# Patient Record
Sex: MALE | Race: BLACK OR AFRICAN AMERICAN | Employment: STUDENT | URBAN - METROPOLITAN AREA
[De-identification: names, ages, dates, MRNs, and addresses within clinical notes are randomized per-mention and may not be internally consistent; named-entity substitution may affect disease eponyms.]

---

## 2023-08-05 ENCOUNTER — HOSPITAL ENCOUNTER (OUTPATIENT)
Age: 23
Discharge: HOME | End: 2023-08-05
Attending: EMERGENCY MEDICINE
Payer: COMMERCIAL

## 2023-08-05 VITALS
SYSTOLIC BLOOD PRESSURE: 150 MMHG | RESPIRATION RATE: 18 BRPM | DIASTOLIC BLOOD PRESSURE: 90 MMHG | TEMPERATURE: 98 F | HEART RATE: 100 BPM | OXYGEN SATURATION: 100 %

## 2023-08-05 DIAGNOSIS — K29.00 ACUTE GASTRITIS, PRESENCE OF BLEEDING UNSPECIFIED, UNSPECIFIED GASTRITIS TYPE: Primary | ICD-10-CM

## 2023-08-05 PROCEDURE — 99203 OFFICE O/P NEW LOW 30 MIN: CPT

## 2023-08-05 PROCEDURE — 99202 OFFICE O/P NEW SF 15 MIN: CPT

## 2023-08-05 RX ORDER — PANTOPRAZOLE SODIUM 20 MG/1
20 TABLET, DELAYED RELEASE ORAL DAILY
Qty: 30 TABLET | Refills: 0 | Status: SHIPPED | OUTPATIENT
Start: 2023-08-05 | End: 2023-09-04

## 2023-08-05 RX ORDER — DEXTROAMPHETAMINE SACCHARATE, AMPHETAMINE ASPARTATE, DEXTROAMPHETAMINE SULFATE AND AMPHETAMINE SULFATE 2.5; 2.5; 2.5; 2.5 MG/1; MG/1; MG/1; MG/1
TABLET ORAL 2 TIMES DAILY
COMMUNITY
Start: 2023-07-25

## 2023-08-05 NOTE — ED INITIAL ASSESSMENT (HPI)
Presents with intermittent epigastric pain for 2-3 weeks. No fever. No n/v/d. States pain wakes hip up from sleep at times.

## 2023-08-05 NOTE — DISCHARGE INSTRUCTIONS
Thank you for visiting our immediate care for your health care needs. Please follow up with your regular doctor in the next 1-2 days. If you have any additional problems please return to the immediate care. Take Protonix as prescribed or take omeprazole over-the-counter.

## 2024-02-12 ENCOUNTER — APPOINTMENT (OUTPATIENT)
Dept: CT IMAGING | Facility: HOSPITAL | Age: 24
End: 2024-02-12
Attending: EMERGENCY MEDICINE
Payer: COMMERCIAL

## 2024-02-12 ENCOUNTER — LAB ENCOUNTER (OUTPATIENT)
Dept: LAB | Facility: HOSPITAL | Age: 24
End: 2024-02-12
Attending: INTERNAL MEDICINE
Payer: COMMERCIAL

## 2024-02-12 ENCOUNTER — OFFICE VISIT (OUTPATIENT)
Dept: INTERNAL MEDICINE CLINIC | Facility: CLINIC | Age: 24
End: 2024-02-12
Payer: COMMERCIAL

## 2024-02-12 ENCOUNTER — HOSPITAL ENCOUNTER (INPATIENT)
Facility: HOSPITAL | Age: 24
LOS: 1 days | Discharge: HOME | End: 2024-02-14
Attending: EMERGENCY MEDICINE | Admitting: HOSPITALIST
Payer: COMMERCIAL

## 2024-02-12 VITALS
WEIGHT: 148 LBS | SYSTOLIC BLOOD PRESSURE: 110 MMHG | DIASTOLIC BLOOD PRESSURE: 60 MMHG | OXYGEN SATURATION: 99 % | HEART RATE: 117 BPM | TEMPERATURE: 98 F | HEIGHT: 66.5 IN | BODY MASS INDEX: 23.5 KG/M2

## 2024-02-12 DIAGNOSIS — D64.9 ANEMIA, UNSPECIFIED TYPE: Primary | ICD-10-CM

## 2024-02-12 DIAGNOSIS — I31.39 PERICARDIAL EFFUSION (HCC): ICD-10-CM

## 2024-02-12 DIAGNOSIS — R10.13 MIDEPIGASTRIC PAIN: Primary | ICD-10-CM

## 2024-02-12 DIAGNOSIS — R07.9 CHEST PAIN, UNSPECIFIED TYPE: ICD-10-CM

## 2024-02-12 DIAGNOSIS — D50.9 MICROCYTIC ANEMIA: ICD-10-CM

## 2024-02-12 DIAGNOSIS — Z00.00 PREVENTATIVE HEALTH CARE: ICD-10-CM

## 2024-02-12 DIAGNOSIS — Z11.3 SCREEN FOR STD (SEXUALLY TRANSMITTED DISEASE): ICD-10-CM

## 2024-02-12 DIAGNOSIS — K92.1 MELENA: ICD-10-CM

## 2024-02-12 DIAGNOSIS — K29.80 DUODENITIS: ICD-10-CM

## 2024-02-12 DIAGNOSIS — R10.13 MIDEPIGASTRIC PAIN: ICD-10-CM

## 2024-02-12 LAB
ALBUMIN SERPL-MCNC: 4.6 G/DL (ref 3.2–4.8)
ALBUMIN/GLOB SERPL: 1.3 {RATIO} (ref 1–2)
ALP LIVER SERPL-CCNC: 106 U/L
ALT SERPL-CCNC: 9 U/L
ANION GAP SERPL CALC-SCNC: 8 MMOL/L (ref 0–18)
ANION GAP SERPL CALC-SCNC: 8 MMOL/L (ref 0–18)
ANTIBODY SCREEN: NEGATIVE
AST SERPL-CCNC: 11 U/L (ref ?–34)
BASOPHILS # BLD AUTO: 0.09 X10(3) UL (ref 0–0.2)
BASOPHILS NFR BLD AUTO: 0.9 %
BILIRUB SERPL-MCNC: 0.3 MG/DL (ref 0.3–1.2)
BUN BLD-MCNC: 9 MG/DL (ref 9–23)
BUN BLD-MCNC: 9 MG/DL (ref 9–23)
BUN/CREAT SERPL: 9.6 (ref 10–20)
BUN/CREAT SERPL: 9.7 (ref 10–20)
CALCIUM BLD-MCNC: 9.4 MG/DL (ref 8.7–10.4)
CALCIUM BLD-MCNC: 9.5 MG/DL (ref 8.7–10.4)
CHLORIDE SERPL-SCNC: 106 MMOL/L (ref 98–112)
CHLORIDE SERPL-SCNC: 107 MMOL/L (ref 98–112)
CHOLEST SERPL-MCNC: 96 MG/DL (ref ?–200)
CO2 SERPL-SCNC: 24 MMOL/L (ref 21–32)
CO2 SERPL-SCNC: 25 MMOL/L (ref 21–32)
CREAT BLD-MCNC: 0.93 MG/DL
CREAT BLD-MCNC: 0.94 MG/DL
DEPRECATED HBV CORE AB SER IA-ACNC: 0.9 NG/ML
DEPRECATED RDW RBC AUTO: 42.5 FL (ref 35.1–46.3)
EGFRCR SERPLBLD CKD-EPI 2021: 118 ML/MIN/1.73M2 (ref 60–?)
EGFRCR SERPLBLD CKD-EPI 2021: 119 ML/MIN/1.73M2 (ref 60–?)
EOSINOPHIL # BLD AUTO: 0.35 X10(3) UL (ref 0–0.7)
EOSINOPHIL NFR BLD AUTO: 3.4 %
ERYTHROCYTE [DISTWIDTH] IN BLOOD BY AUTOMATED COUNT: 20.3 % (ref 11–15)
EST. AVERAGE GLUCOSE BLD GHB EST-MCNC: 100 MG/DL (ref 68–126)
FASTING PATIENT LIPID ANSWER: NO
FASTING STATUS PATIENT QL REPORTED: NO
GLOBULIN PLAS-MCNC: 3.5 G/DL (ref 2.8–4.4)
GLUCOSE BLD-MCNC: 115 MG/DL (ref 70–99)
GLUCOSE BLD-MCNC: 84 MG/DL (ref 70–99)
HBA1C MFR BLD: 5.1 % (ref ?–5.7)
HCT VFR BLD AUTO: 25.7 %
HDLC SERPL-MCNC: 35 MG/DL (ref 40–59)
HGB BLD-MCNC: 6.2 G/DL
HGB BLD-MCNC: 7.1 G/DL
IMM GRANULOCYTES # BLD AUTO: 0.02 X10(3) UL (ref 0–1)
IMM GRANULOCYTES NFR BLD: 0.2 %
IRON SATN MFR SERPL: 2 %
IRON SATN MFR SERPL: 3 %
IRON SERPL-MCNC: 12 UG/DL
IRON SERPL-MCNC: 13 UG/DL
LDLC SERPL CALC-MCNC: 48 MG/DL (ref ?–100)
LIPASE SERPL-CCNC: 56 U/L (ref 13–75)
LYMPHOCYTES # BLD AUTO: 2.56 X10(3) UL (ref 1–4)
LYMPHOCYTES NFR BLD AUTO: 24.8 %
MCH RBC QN AUTO: 14.5 PG (ref 26–34)
MCHC RBC AUTO-ENTMCNC: 24.1 G/DL (ref 31–37)
MCV RBC AUTO: 59.9 FL
MONOCYTES # BLD AUTO: 0.73 X10(3) UL (ref 0.1–1)
MONOCYTES NFR BLD AUTO: 7.1 %
NEUTROPHILS # BLD AUTO: 6.57 X10 (3) UL (ref 1.5–7.7)
NEUTROPHILS # BLD AUTO: 6.57 X10(3) UL (ref 1.5–7.7)
NEUTROPHILS NFR BLD AUTO: 63.6 %
NONHDLC SERPL-MCNC: 61 MG/DL (ref ?–130)
OSMOLALITY SERPL CALC.SUM OF ELEC: 286 MOSM/KG (ref 275–295)
OSMOLALITY SERPL CALC.SUM OF ELEC: 288 MOSM/KG (ref 275–295)
PLATELET # BLD AUTO: 570 10(3)UL (ref 150–450)
PLATELET MORPHOLOGY: NORMAL
POTASSIUM SERPL-SCNC: 3.9 MMOL/L (ref 3.5–5.1)
POTASSIUM SERPL-SCNC: 4.1 MMOL/L (ref 3.5–5.1)
PROT SERPL-MCNC: 8.1 G/DL (ref 5.7–8.2)
RBC # BLD AUTO: 4.29 X10(6)UL
RH BLOOD TYPE: POSITIVE
RH BLOOD TYPE: POSITIVE
SODIUM SERPL-SCNC: 138 MMOL/L (ref 136–145)
SODIUM SERPL-SCNC: 140 MMOL/L (ref 136–145)
T PALLIDUM AB SER QL IA: NONREACTIVE
TIBC SERPL-MCNC: 501 UG/DL (ref 250–425)
TIBC SERPL-MCNC: 507 UG/DL (ref 250–425)
TRANSFERRIN SERPL-MCNC: 336 MG/DL (ref 215–365)
TRANSFERRIN SERPL-MCNC: 340 MG/DL (ref 215–365)
TRIGL SERPL-MCNC: 54 MG/DL (ref 30–149)
TSI SER-ACNC: 1.32 MIU/ML (ref 0.55–4.78)
VLDLC SERPL CALC-MCNC: 8 MG/DL (ref 0–30)
WBC # BLD AUTO: 10.3 X10(3) UL (ref 4–11)

## 2024-02-12 PROCEDURE — 83013 H PYLORI (C-13) BREATH: CPT

## 2024-02-12 PROCEDURE — 36415 COLL VENOUS BLD VENIPUNCTURE: CPT

## 2024-02-12 PROCEDURE — 83540 ASSAY OF IRON: CPT

## 2024-02-12 PROCEDURE — 74177 CT ABD & PELVIS W/CONTRAST: CPT | Performed by: EMERGENCY MEDICINE

## 2024-02-12 PROCEDURE — 30233N1 TRANSFUSION OF NONAUTOLOGOUS RED BLOOD CELLS INTO PERIPHERAL VEIN, PERCUTANEOUS APPROACH: ICD-10-PCS | Performed by: EMERGENCY MEDICINE

## 2024-02-12 PROCEDURE — 84443 ASSAY THYROID STIM HORMONE: CPT

## 2024-02-12 PROCEDURE — 85025 COMPLETE CBC W/AUTO DIFF WBC: CPT | Performed by: INTERNAL MEDICINE

## 2024-02-12 PROCEDURE — 87491 CHLMYD TRACH DNA AMP PROBE: CPT

## 2024-02-12 PROCEDURE — 83020 HEMOGLOBIN ELECTROPHORESIS: CPT | Performed by: INTERNAL MEDICINE

## 2024-02-12 PROCEDURE — 93005 ELECTROCARDIOGRAM TRACING: CPT

## 2024-02-12 PROCEDURE — 83036 HEMOGLOBIN GLYCOSYLATED A1C: CPT

## 2024-02-12 PROCEDURE — 83021 HEMOGLOBIN CHROMOTOGRAPHY: CPT | Performed by: INTERNAL MEDICINE

## 2024-02-12 PROCEDURE — 80053 COMPREHEN METABOLIC PANEL: CPT

## 2024-02-12 PROCEDURE — 93010 ELECTROCARDIOGRAM REPORT: CPT | Performed by: STUDENT IN AN ORGANIZED HEALTH CARE EDUCATION/TRAINING PROGRAM

## 2024-02-12 PROCEDURE — 87389 HIV-1 AG W/HIV-1&-2 AB AG IA: CPT

## 2024-02-12 PROCEDURE — 84466 ASSAY OF TRANSFERRIN: CPT

## 2024-02-12 PROCEDURE — 99222 1ST HOSP IP/OBS MODERATE 55: CPT | Performed by: HOSPITALIST

## 2024-02-12 PROCEDURE — 82728 ASSAY OF FERRITIN: CPT

## 2024-02-12 PROCEDURE — 86780 TREPONEMA PALLIDUM: CPT

## 2024-02-12 PROCEDURE — 85060 BLOOD SMEAR INTERPRETATION: CPT | Performed by: INTERNAL MEDICINE

## 2024-02-12 PROCEDURE — 80061 LIPID PANEL: CPT

## 2024-02-12 PROCEDURE — 87591 N.GONORRHOEAE DNA AMP PROB: CPT

## 2024-02-12 RX ORDER — ONDANSETRON 2 MG/ML
4 INJECTION INTRAMUSCULAR; INTRAVENOUS EVERY 6 HOURS PRN
Status: DISCONTINUED | OUTPATIENT
Start: 2024-02-12 | End: 2024-02-14

## 2024-02-12 RX ORDER — OMEPRAZOLE 40 MG/1
40 CAPSULE, DELAYED RELEASE ORAL DAILY
Qty: 90 CAPSULE | Refills: 0 | Status: ON HOLD | OUTPATIENT
Start: 2024-02-12 | End: 2025-02-06

## 2024-02-12 RX ORDER — TEMAZEPAM 15 MG/1
15 CAPSULE ORAL NIGHTLY PRN
Status: DISCONTINUED | OUTPATIENT
Start: 2024-02-12 | End: 2024-02-14

## 2024-02-12 RX ORDER — DEXTROAMPHETAMINE SACCHARATE, AMPHETAMINE ASPARTATE, DEXTROAMPHETAMINE SULFATE AND AMPHETAMINE SULFATE 5; 5; 5; 5 MG/1; MG/1; MG/1; MG/1
1 TABLET ORAL 2 TIMES DAILY
Status: ON HOLD | COMMUNITY
Start: 2024-01-18

## 2024-02-12 RX ORDER — ACETAMINOPHEN 500 MG
500 TABLET ORAL EVERY 4 HOURS PRN
Status: DISCONTINUED | OUTPATIENT
Start: 2024-02-12 | End: 2024-02-14

## 2024-02-12 RX ORDER — ONDANSETRON 2 MG/ML
4 INJECTION INTRAMUSCULAR; INTRAVENOUS EVERY 4 HOURS PRN
Status: DISCONTINUED | OUTPATIENT
Start: 2024-02-12 | End: 2024-02-12

## 2024-02-12 RX ORDER — PROCHLORPERAZINE EDISYLATE 5 MG/ML
5 INJECTION INTRAMUSCULAR; INTRAVENOUS EVERY 8 HOURS PRN
Status: DISCONTINUED | OUTPATIENT
Start: 2024-02-12 | End: 2024-02-14

## 2024-02-12 NOTE — PROGRESS NOTES
[facility]  New Patient History and Physical      HPI:     Chief Complaint   Patient presents with    Establish Care    Abdominal Pain     Sometimes blood in the stools    Epigastric Pain       Jacob Patton is a 23 year old male presenting for:  Establishment of care.  Has  has a past medical history of ADD (attention deficit disorder).      Of concern has had bad abdominal pain for the past 3-4 months.  Does recall about 1-2 months ago he had dark black stool.  He reports light headedness, fatigue and occasional SOB.      Not on any medication.  Was given PPI from urgent care clinic last year but does not feel that it helped.  Pain is severe and in midepigastric area.          Labs:   Complete Metabolic Panel:  No results found for: \"NA\", \"K\", \"CL\", \"CO2\", \"CREATSERUM\", \"CA\", \"GLU\", \"TP\", \"ALB\", \"ALKPHO\", \"AST\", \"ALT\", \"BILT\", \"TSH\", \"T4F\"     CBC:  @LABRCNTIP(RBC:3,HGB:3,HCT:3,MCV:3,MCH:3,MCHC:3,RDW:3,NEPRELIM:3,WBC:3,PLT:3)@       Hemoglobin A1C, Microalbumin  No results found for: \"A1C\"     Lipid panel  No results found for: \"CHOLEST\", \"HDL\", \"TRIG\", \"LDL\", \"NONHDLC\"  The ASCVD Risk score (Jc GUILLORY, et al., 2019) failed to calculate for the following reasons:    The 2019 ASCVD risk score is only valid for ages 40 to 79       Medications:  Current Outpatient Medications   Medication Sig Dispense Refill    amphetamine-dextroamphetamine 20 MG Oral Tab Take 1 tablet by mouth 2 (two) times daily.        PMH:  Past Medical History:   Diagnosis Date    ADD (attention deficit disorder)          PSH:  No past surgical history on file.    Allergies:  No Known Allergies   Social History:  Social History     Socioeconomic History    Marital status: Single   Tobacco Use    Smoking status: Every Day     Types: Cigarettes     Passive exposure: Never   Vaping Use    Vaping Use: Every day    Substances: Nicotine    Devices: Disposable   Substance and Sexual Activity    Alcohol use: Yes      Comment: social    Drug use: Never        Family History:  Family History   Problem Relation Age of Onset    No Known Problems Father     No Known Problems Sister     No Known Problems Brother           REVIEW OF SYSTEMS:   Review of Systems   Constitutional:  Negative for chills, fatigue, fever and unexpected weight change.   HENT:  Negative for congestion, ear pain, hearing loss, rhinorrhea, sinus pain and sore throat.    Eyes:  Negative for pain, redness and visual disturbance.   Respiratory:  Negative for apnea, cough, chest tightness, shortness of breath and wheezing.    Cardiovascular:  Negative for chest pain, palpitations and leg swelling.   Gastrointestinal:  Positive for abdominal pain. Negative for abdominal distention, blood in stool, constipation and nausea.   Endocrine: Negative for cold intolerance, heat intolerance and polyuria.   Genitourinary:  Negative for dysuria, hematuria and urgency.   Musculoskeletal:  Negative for arthralgias, back pain, gait problem, joint swelling, myalgias and neck pain.   Skin:  Negative for rash and wound.   Allergic/Immunologic: Negative for food allergies and immunocompromised state.   Neurological:  Negative for dizziness, seizures, facial asymmetry, speech difficulty, weakness, light-headedness, numbness and headaches.   Hematological:  Negative for adenopathy. Does not bruise/bleed easily.   Psychiatric/Behavioral:  Negative for behavioral problems, sleep disturbance and suicidal ideas. The patient is not nervous/anxious.             PHYSICAL EXAM:   /60   Pulse 117   Temp 98.1 °F (36.7 °C)   Ht 5' 6.5\" (1.689 m)   Wt 148 lb (67.1 kg)   SpO2 99%   BMI 23.53 kg/m²  Estimated body mass index is 23.53 kg/m² as calculated from the following:    Height as of this encounter: 5' 6.5\" (1.689 m).    Weight as of this encounter: 148 lb (67.1 kg).     Wt Readings from Last 3 Encounters:   02/12/24 148 lb (67.1 kg)       Physical Exam  Vitals reviewed.    Constitutional:       General: He is not in acute distress.     Appearance: He is well-developed.   HENT:      Head: Normocephalic and atraumatic.   Eyes:      Conjunctiva/sclera: Conjunctivae normal.      Pupils: Pupils are equal, round, and reactive to light.   Neck:      Thyroid: No thyromegaly.   Cardiovascular:      Rate and Rhythm: Normal rate and regular rhythm.      Heart sounds: Normal heart sounds, S1 normal and S2 normal. No murmur heard.     No friction rub. No gallop.   Pulmonary:      Effort: Pulmonary effort is normal. No respiratory distress.      Breath sounds: Normal breath sounds. No wheezing or rales.   Chest:      Chest wall: No tenderness.   Abdominal:      General: Bowel sounds are normal. There is no distension.      Palpations: Abdomen is soft. There is no mass.      Tenderness: There is abdominal tenderness. There is no guarding or rebound.   Musculoskeletal:         General: No tenderness. Normal range of motion.      Cervical back: Normal range of motion.   Lymphadenopathy:      Cervical: No cervical adenopathy.   Skin:     General: Skin is warm.      Findings: No erythema or rash.   Neurological:      Mental Status: He is alert and oriented to person, place, and time.      Cranial Nerves: No cranial nerve deficit.      Deep Tendon Reflexes: Reflexes are normal and symmetric.   Psychiatric:         Behavior: Behavior normal.         Thought Content: Thought content normal.         Judgment: Judgment normal.             ASSESSMENT AND PLAN:   Patient is a 23 year old male who presents primarily presents for:    (R10.13) Midepigastric pain  (primary encounter diagnosis)  Plan: H. Pylori Breath Test, Adult (>17) [E], CBC         With Differential With Platelet, Gastro         Referral - In Network, MD BLOOD SMEAR CONSULT         (Z00.00) Preventative health care  Plan: Comp Metabolic Panel (14) [E], Hemoglobin A1C         (Glycohemoglobin) [E], TSH W Reflex To Free T4         [E], Lipid  Panel [E]            (Z11.3) Screen for STD (sexually transmitted disease)  Plan: HIV Ag/Ab Combo, T Pallidum Screening [place]         TREP [E], Chlamydia/Gc Amplification            (K92.1) Melena  Plan: Gastro Referral - In Network            (R07.9) Chest pain, unspecified type  Plan: EKG 12 Lead to be performed at [facility]            (D50.9) Microcytic anemia  Plan: Iron And Tibc [E], Ferritin [E], Hemoglobin         Electrophoresis w/Rflx Alpha,beta Chain,         Hemoglobin Electrophoresis w/Rflx Alpha,beta         Chain          HgB level returned at 6.0.  I recommend ER evaluation given his symptoms and HgB level less than 7.0.  Concern of upper GI bleed.  Recommend consulting GI.           Health Maintenance:  Health Maintenance   Topic Date Due    Annual Physical  Never done    COVID-19 Vaccine (1) Never done    Pneumococcal Vaccine: Birth to 64yrs (1 of 2 - PCV) Never done    HPV Vaccines (1 - Male 2-dose series) Never done    Tobacco Cessation Counseling 1 Year  Never done    DTaP,Tdap,and Td Vaccines (1 - Tdap) Never done    Influenza Vaccine (1) Never done    Annual Depression Screening  Never done             Meds & Refills for this Visit:  Requested Prescriptions      No prescriptions requested or ordered in this encounter       No orders of the defined types were placed in this encounter.      Imaging & Consults:  None        Nj Randhawa MD     No follow-ups on file.  Important issues to follow up on next visit      Patient indicates understanding of the above recommendations and agrees to the above plan.  Reasurrance and education provided. All questions answered.  Notified to call with any questions, complications, allergies, or worsening or changing symptoms as well as any side effects or complications from the treatments .  Red flags/ ER precautions discussed.    This note was produced using voice recognition software.  As a result, errors may occur.  When  identified, these errors have been corrected.  While every attempt is made to correct errors during dictation, errors may still exist.    Total time spent was 62 minutes which includes: Preparation to see patient including chart review, reviewing appropriate medical history, counseling and education (diet and exercise), discussing treatment options, ordering appropriate diagnostic tests and documentation.      Nj Randhawa MD  Internal Medicine/Primary Care  [facility]

## 2024-02-12 NOTE — ED INITIAL ASSESSMENT (HPI)
Patient sent from pcp for low hgb of 6.1. patient reports having abdominal pain for the last few months which prompted lab work. Patient denies rectal bleeding, denies blood in urine. Intermittent feeling of lightheadedness and dizziness.

## 2024-02-13 ENCOUNTER — ANESTHESIA EVENT (OUTPATIENT)
Dept: ENDOSCOPY | Facility: HOSPITAL | Age: 24
End: 2024-02-13
Payer: COMMERCIAL

## 2024-02-13 ENCOUNTER — ANESTHESIA (OUTPATIENT)
Dept: ENDOSCOPY | Facility: HOSPITAL | Age: 24
End: 2024-02-13
Payer: COMMERCIAL

## 2024-02-13 LAB
ANION GAP SERPL CALC-SCNC: 7 MMOL/L (ref 0–18)
ATRIAL RATE: 92 BPM
BASOPHILS # BLD AUTO: 0.09 X10(3) UL (ref 0–0.2)
BASOPHILS # BLD AUTO: 0.09 X10(3) UL (ref 0–0.2)
BASOPHILS NFR BLD AUTO: 0.7 %
BASOPHILS NFR BLD AUTO: 1 %
BUN BLD-MCNC: 6 MG/DL (ref 9–23)
BUN/CREAT SERPL: 6.7 (ref 10–20)
C TRACH DNA SPEC QL NAA+PROBE: NEGATIVE
CALCIUM BLD-MCNC: 9.4 MG/DL (ref 8.7–10.4)
CHLORIDE SERPL-SCNC: 107 MMOL/L (ref 98–112)
CO2 SERPL-SCNC: 23 MMOL/L (ref 21–32)
CREAT BLD-MCNC: 0.89 MG/DL
DEPRECATED RDW RBC AUTO: 42.5 FL (ref 35.1–46.3)
DEPRECATED RDW RBC AUTO: 47.4 FL (ref 35.1–46.3)
EGFRCR SERPLBLD CKD-EPI 2021: 124 ML/MIN/1.73M2 (ref 60–?)
EOSINOPHIL # BLD AUTO: 0.26 X10(3) UL (ref 0–0.7)
EOSINOPHIL # BLD AUTO: 0.34 X10(3) UL (ref 0–0.7)
EOSINOPHIL NFR BLD AUTO: 2.6 %
EOSINOPHIL NFR BLD AUTO: 2.9 %
ERYTHROCYTE [DISTWIDTH] IN BLOOD BY AUTOMATED COUNT: 19.9 % (ref 11–15)
ERYTHROCYTE [DISTWIDTH] IN BLOOD BY AUTOMATED COUNT: 22.6 % (ref 11–15)
GLUCOSE BLD-MCNC: 80 MG/DL (ref 70–99)
HCT VFR BLD AUTO: 24.8 %
HCT VFR BLD AUTO: 27.5 %
HGB BLD-MCNC: 6.1 G/DL
HGB BLD-MCNC: 7.1 G/DL
IMM GRANULOCYTES # BLD AUTO: 0.02 X10(3) UL (ref 0–1)
IMM GRANULOCYTES # BLD AUTO: 0.05 X10(3) UL (ref 0–1)
IMM GRANULOCYTES NFR BLD: 0.2 %
IMM GRANULOCYTES NFR BLD: 0.4 %
LYMPHOCYTES # BLD AUTO: 1.78 X10(3) UL (ref 1–4)
LYMPHOCYTES # BLD AUTO: 2.07 X10(3) UL (ref 1–4)
LYMPHOCYTES NFR BLD AUTO: 15.5 %
LYMPHOCYTES NFR BLD AUTO: 19.8 %
MCH RBC QN AUTO: 14.9 PG (ref 26–34)
MCH RBC QN AUTO: 15.9 PG (ref 26–34)
MCHC RBC AUTO-ENTMCNC: 24.6 G/DL (ref 31–37)
MCHC RBC AUTO-ENTMCNC: 25.8 G/DL (ref 31–37)
MCV RBC AUTO: 60.6 FL
MCV RBC AUTO: 61.5 FL
MONOCYTES # BLD AUTO: 0.84 X10(3) UL (ref 0.1–1)
MONOCYTES # BLD AUTO: 1.06 X10(3) UL (ref 0.1–1)
MONOCYTES NFR BLD AUTO: 8 %
MONOCYTES NFR BLD AUTO: 9.4 %
N GONORRHOEA DNA SPEC QL NAA+PROBE: NEGATIVE
NEUTROPHILS # BLD AUTO: 5.99 X10 (3) UL (ref 1.5–7.7)
NEUTROPHILS # BLD AUTO: 5.99 X10(3) UL (ref 1.5–7.7)
NEUTROPHILS # BLD AUTO: 9.71 X10 (3) UL (ref 1.5–7.7)
NEUTROPHILS # BLD AUTO: 9.71 X10(3) UL (ref 1.5–7.7)
NEUTROPHILS NFR BLD AUTO: 66.7 %
NEUTROPHILS NFR BLD AUTO: 72.8 %
OSMOLALITY SERPL CALC.SUM OF ELEC: 281 MOSM/KG (ref 275–295)
P AXIS: 81 DEGREES
P-R INTERVAL: 146 MS
PLATELET # BLD AUTO: 486 10(3)UL (ref 150–450)
PLATELET # BLD AUTO: 536 10(3)UL (ref 150–450)
POTASSIUM SERPL-SCNC: 3.8 MMOL/L (ref 3.5–5.1)
Q-T INTERVAL: 336 MS
QRS DURATION: 80 MS
QTC CALCULATION (BEZET): 415 MS
R AXIS: 85 DEGREES
RBC # BLD AUTO: 4.09 X10(6)UL
RBC # BLD AUTO: 4.47 X10(6)UL
SODIUM SERPL-SCNC: 137 MMOL/L (ref 136–145)
T AXIS: 62 DEGREES
VENTRICULAR RATE: 92 BPM
WBC # BLD AUTO: 13.3 X10(3) UL (ref 4–11)
WBC # BLD AUTO: 9 X10(3) UL (ref 4–11)

## 2024-02-13 PROCEDURE — 99233 SBSQ HOSP IP/OBS HIGH 50: CPT | Performed by: HOSPITALIST

## 2024-02-13 PROCEDURE — 43239 EGD BIOPSY SINGLE/MULTIPLE: CPT | Performed by: INTERNAL MEDICINE

## 2024-02-13 PROCEDURE — 99244 OFF/OP CNSLTJ NEW/EST MOD 40: CPT | Performed by: INTERNAL MEDICINE

## 2024-02-13 PROCEDURE — 0DB68ZX EXCISION OF STOMACH, VIA NATURAL OR ARTIFICIAL OPENING ENDOSCOPIC, DIAGNOSTIC: ICD-10-PCS | Performed by: INTERNAL MEDICINE

## 2024-02-13 PROCEDURE — 0DB98ZX EXCISION OF DUODENUM, VIA NATURAL OR ARTIFICIAL OPENING ENDOSCOPIC, DIAGNOSTIC: ICD-10-PCS | Performed by: INTERNAL MEDICINE

## 2024-02-13 RX ORDER — LIDOCAINE HYDROCHLORIDE 10 MG/ML
INJECTION, SOLUTION EPIDURAL; INFILTRATION; INTRACAUDAL; PERINEURAL AS NEEDED
Status: DISCONTINUED | OUTPATIENT
Start: 2024-02-13 | End: 2024-02-13 | Stop reason: SURG

## 2024-02-13 RX ADMIN — LIDOCAINE HYDROCHLORIDE 50 MG: 10 INJECTION, SOLUTION EPIDURAL; INFILTRATION; INTRACAUDAL; PERINEURAL at 13:00:00

## 2024-02-13 NOTE — ANESTHESIA POSTPROCEDURE EVALUATION
Patient: Jacob Patton    Procedure Summary       Date: 02/13/24 Room / Location: [facility] ENDOSCOPY 03 / EM ENDOSCOPY    Anesthesia Start: 1259 Anesthesia Stop:     Procedure: ESOPHAGOGASTRODUODENOSCOPY (EGD) Diagnosis: (duodenal ulcer; esophagitis)    Surgeons: Mayuri Reyna MD Anesthesiologist: Juanis Ceja MD    Anesthesia Type: general, MAC ASA Status: 1            Anesthesia Type: general, MAC    Vitals Value Taken Time   /75 02/13/24 1319   Temp 98 02/13/24 1319   Pulse 80 02/13/24 1319   Resp 14 02/13/24 1319   SpO2 97 02/13/24 1319       [facility] AN Post Evaluation:   Patient Evaluated in PACU (endo)  Patient Participation: complete - patient cannot participate  Level of Consciousness: sleepy but conscious  Pain Score: 0  Pain Management: adequate  Airway Patency:patent  Dental exam unchanged from preop  Yes    Cardiovascular Status: acceptable  Respiratory Status: acceptable  Postoperative Hydration acceptable      Juanis Ceja MD  2/13/2024 1:19 PM   Female

## 2024-02-13 NOTE — PRE-SEDATION ASSESSMENT
Physician Pre-Sedation Assessment    Pre-Sedation Assessment:    Sedation History: Previous Sedation with No Complications and Airway Assessed    Cardiac: normal S1, S2  Respiratory: breath sounds clear bilaterally   Abdomen: soft, BS (+), non-tender    ASA Classification: 2. Patient with mild systemic disease    Plan: mac Sedation

## 2024-02-13 NOTE — H&P (VIEW-ONLY)
[facility]   Gastroenterology Consultation Note    Jacob Patton  Patient Status:    Observation  Date of Admission:         2/12/2024, Hospital day #0  Attending:   Cassie Marquez MD  PCP:     Nj Randhawa MD    Reason for Consultation:  BRIDGET    History of Present Illness:  Jacob Patton is a a(n) 23 year old male otherwise healthy, who presents with symptomatic anemia and epigastric abdominal pain.  He states over the past 5 to 6 months he is having epigastric pain and dyspepsia.  Some nausea but no emesis.  Food does not appear to worsen abdominal pain.  No unintentional weight loss.  No melena or hematochezia.  No prior EGD or colonoscopy.  Denies significant NSAID use.  No heartburn or dysphagia. No diarrhea or constipation.     History:  Past Medical History:   Diagnosis Date    ADD (attention deficit disorder)      History reviewed. No pertinent surgical history.  Family History   Problem Relation Age of Onset    No Known Problems Father     No Known Problems Sister     No Known Problems Brother       reports that he has quit smoking. His smoking use included cigarettes. He has never been exposed to tobacco smoke. He has never used smokeless tobacco. He reports current alcohol use. He reports that he does not currently use drugs after having used the following drugs: Cannabis.    Allergies:  Allergies   Allergen Reactions    Shellfish-Derived Products NAUSEA AND VOMITING     Swelling in the mouth and itchy        Medications:    Current Facility-Administered Medications:     iron sucrose (Venofer) 300 mg in sodium chloride 0.9% 250 mL IVPB, 300 mg, Intravenous, Daily    acetaminophen (Tylenol Extra Strength) tab 500 mg, 500 mg, Oral, Q4H PRN    ondansetron (Zofran) 4 MG/2ML injection 4 mg, 4 mg, Intravenous, Q6H PRN    prochlorperazine (Compazine) 10 MG/2ML injection 5 mg, 5 mg, Intravenous, Q8H PRN    temazepam (Restoril) cap 15 mg, 15 mg, Oral, Nightly PRN     pantoprazole (Protonix) 40 mg in sodium chloride 0.9% PF 10 mL IV push, 40 mg, Intravenous, Daily  Medications Prior to Admission   Medication Sig Dispense Refill Last Dose    amphetamine-dextroamphetamine 20 MG Oral Tab Take 1 tablet by mouth 2 (two) times daily.   2/12/2024    Omeprazole 40 MG Oral Capsule Delayed Release Take 1 capsule (40 mg total) by mouth daily. (Patient not taking: Reported on 2/12/2024) 90 capsule 0 Not Taking       Review of Systems:  CONSTITUTIONAL:  negative for fevers, rigors  EYES:  negative for diplopia   RESPIRATORY:  negative for severe shortness of breath  CARDIOVASCULAR:  negative for crushing sub-sternal chest pain  GASTROINTESTINAL:  see HPI  GENITOURINARY:  negative for dysuria or gross hematuria  INTEGUMENT/BREAST:  SKIN:  negative for jaundice   ALLERGIC/IMMUNOLOGIC:  negative for hay fever  ENDOCRINE:  negative for cold intolerance and heat intolerance  MUSCULOSKELETAL:  negative for joint effusion/severe erythema  NEURO: negative for dizziness or loss of conscious or paresthesias  BEHAVIOR/PSYCH:  negative for psychotic behavior    Physical Exam:    Blood pressure 112/61, pulse 77, temperature 98.6 °F (37 °C), temperature source Oral, resp. rate 18, height 5' 7\" (1.702 m), weight 148 lb (67.1 kg), SpO2 99%. Body mass index is 23.18 kg/m².    General: awake, alert and oriented, no acute distress  HEENT: moist mucus membranes  PULM: no conversational dyspnea  CARDIOVASCULAR: regular rate and rhythm, the extremities are warm and well perfused  GI: soft, non-tender, non-distended, + BS, no rebound/guarding   EXTREMITIES: no edema, moving all extremities  SKIN: no visible rash  NEURO: appropriate and interactive    Laboratory Data:  Lab Results   Component Value Date    WBC 13.3 02/13/2024    HGB 7.1 02/13/2024    HCT 27.5 02/13/2024    .0 02/13/2024    CREATSERUM 0.89 02/13/2024    BUN 6 02/13/2024     02/13/2024    K 3.8 02/13/2024     02/13/2024    CO2 23.0  02/13/2024    GLU 80 02/13/2024    CA 9.4 02/13/2024    LIP 56 02/12/2024       Imaging:  CT ABDOMEN+PELVIS(CONTRAST ONLY)(CPT=74177)    Result Date: 2/12/2024  CONCLUSION:   Diffuse wall thickening with mucosal hyperenhancement of the first portion of the duodenum, compatible with duodenitis.  Recommend correlation with endoscopy for assessment of etiology.  No evidence of perforation.  6 millimeter tasia hepatis lymph node is not enlarged by imaging criteria.  This could be reactive in the setting of duodenitis.  Small pericardial effusion, nonspecific.      Dictated by (CST): Freda Douglas MD on 2/12/2024 at 8:37 PM     Finalized by (CST): Freda Douglas MD on 2/12/2024 at 8:47 PM           Assessment & Plan   Jacob Patton is a a(n) 23 year old male otherwise healthy, who presents with symptomatic anemia and epigastric abdominal pain.  Labs on admission notable for hemoglobin 6.1 status post 1 unit PRBC with appropriate response to hemoglobin 7 1.  Iron studies consistent with profound iron deficiency anemia. CT with diffuse wall thickening of proximal duodenum. Possible peptic ulcer disease vs inflammatory process such as celiac sprue vs less likely neoplasm. No significant nsaid use.     Recommend:  - PPI 40mg IV BID  - plan EGD   - IV venofer  - s/p 1 unit prbc   - check TTG    Mayuri Reyna MD  [facility]  2/13/2024    This note was partially prepared using Dragon Medical voice recognition dictation software. As a result, errors may occur. When identified, these errors have been corrected. While every attempt is made to correct errors during dictation, discrepancies may still exist.

## 2024-02-13 NOTE — ANESTHESIA PREPROCEDURE EVALUATION
Anesthesia PreOp Note    HPI:     Jacob Patton is a 23 year old male who presents for preoperative consultation requested by: Mayuri Reyna MD    Date of Surgery: 2/12/2024 - 2/13/2024    Procedure(s):  ESOPHAGOGASTRODUODENOSCOPY (EGD)  Indication: anemia    Relevant Problems   No relevant active problems       NPO:  Last Liquid Consumption Date: 02/13/24  Last Liquid Consumption Time: 0600  Last Solid Consumption Date: 02/12/24  Last Solid Consumption Time: 1630  Last Liquid Consumption Date: 02/13/24          History Review:  Patient Active Problem List    Diagnosis Date Noted    Anemia, unspecified type 02/12/2024    Duodenitis 02/12/2024    Pericardial effusion 02/12/2024       Past Medical History:   Diagnosis Date    ADD (attention deficit disorder)        History reviewed. No pertinent surgical history.    Medications Prior to Admission   Medication Sig Dispense Refill Last Dose    amphetamine-dextroamphetamine 20 MG Oral Tab Take 1 tablet by mouth 2 (two) times daily.   2/12/2024    Omeprazole 40 MG Oral Capsule Delayed Release Take 1 capsule (40 mg total) by mouth daily. (Patient not taking: Reported on 2/12/2024) 90 capsule 0 Not Taking     Current Facility-Administered Medications Ordered in Epic   Medication Dose Route Frequency Provider Last Rate Last Admin    iron sucrose (Venofer) 300 mg in sodium chloride 0.9% 250 mL IVPB  300 mg Intravenous Daily Mayuri Reyna .7 mL/hr at 02/13/24 1054 300 mg at 02/13/24 1054    acetaminophen (Tylenol Extra Strength) tab 500 mg  500 mg Oral Q4H PRN Cassie Marquez MD        ondansetron (Zofran) 4 MG/2ML injection 4 mg  4 mg Intravenous Q6H PRN Cassie Marquez MD        prochlorperazine (Compazine) 10 MG/2ML injection 5 mg  5 mg Intravenous Q8H PRN Cassie Marquez MD        temazepam (Restoril) cap 15 mg  15 mg Oral Nightly PRN Cassie Marquez MD        pantoprazole (Protonix) 40 mg in sodium chloride 0.9% PF 10 mL IV push  40 mg Intravenous  Daily Cassie Marquez MD   40 mg at 02/13/24 0954     No current Epic-ordered outpatient medications on file.       Allergies   Allergen Reactions    Shellfish-Derived Products NAUSEA AND VOMITING     Swelling in the mouth and itchy        Family History   Problem Relation Age of Onset    No Known Problems Father     No Known Problems Sister     No Known Problems Brother      Social History     Socioeconomic History    Marital status: Single   Tobacco Use    Smoking status: Former     Types: Cigarettes     Passive exposure: Never    Smokeless tobacco: Never   Vaping Use    Vaping Use: Every day    Substances: Nicotine    Devices: Disposable   Substance and Sexual Activity    Alcohol use: Yes     Comment: social    Drug use: Not Currently     Types: Cannabis       Available pre-op labs reviewed.  Lab Results   Component Value Date    WBC 13.3 (H) 02/13/2024    RBC 4.47 02/13/2024    HGB 7.1 (L) 02/13/2024    HCT 27.5 (L) 02/13/2024    MCV 61.5 (L) 02/13/2024    MCH 15.9 (L) 02/13/2024    MCHC 25.8 (L) 02/13/2024    RDW 22.6 (H) 02/13/2024    .0 (H) 02/13/2024     Lab Results   Component Value Date     02/13/2024    K 3.8 02/13/2024     02/13/2024    CO2 23.0 02/13/2024    BUN 6 (L) 02/13/2024    CREATSERUM 0.89 02/13/2024    GLU 80 02/13/2024    CA 9.4 02/13/2024          Vital Signs:  Body mass index is 23.18 kg/m².   height is 1.702 m (5' 7\") and weight is 67.1 kg (148 lb). His oral temperature is 98.7 °F (37.1 °C). His blood pressure is 99/51 and his pulse is 83. His respiration is 18 and oxygen saturation is 99%.   Vitals:    02/12/24 2323 02/13/24 0536 02/13/24 0952 02/13/24 1200   BP:  123/68 112/61 99/51   Pulse: 76 75 77 83   Resp:  18 18    Temp:  98.6 °F (37 °C) 98.6 °F (37 °C) 98.7 °F (37.1 °C)   TempSrc:  Oral Oral Oral   SpO2:  99% 99%    Weight:       Height:            Anesthesia Evaluation     Patient summary reviewed and Nursing notes reviewed    No history of anesthetic  complications   Airway   Mallampati: II  TM distance: >3 FB  Neck ROM: full  Dental      Comment: Patient denies any additional loose, missing, and chipped teeth.    Pulmonary - negative ROS and normal exam    breath sounds clear to auscultation  (-) asthma, shortness of breath, recent URI, sleep apnea  Cardiovascular - negative ROS and normal exam  Exercise tolerance: good  (-) pacemaker, hypertension, valvular problems/murmurs, past MI, CAD, CABG/stent, dysrhythmias, angina, CHF    Rhythm: regular  Rate: normal    Neuro/Psych - negative ROS   (-) seizures, TIA, CVA    GI/Hepatic/Renal - negative ROS   (-) GERD, liver disease, renal disease    Endo/Other - negative ROS   (-) diabetes mellitus, blood dyscrasia  Abdominal                  Anesthesia Plan:   ASA:  1  Plan:   General and MAC  Informed Consent Plan and Risks Discussed With:  Patient      I have informed Jacob Shabazzabilios and/or legal guardian or family member of the nature of the anesthetic plan, benefits, risks including possible dental damage if relevant, major complications, and any alternative forms of anesthetic management.   All of the patient's questions were answered to the best of my ability. The patient desires the anesthetic management as planned.  Juanis Ceja MD  2/13/2024 12:34 PM  Present on Admission:  **None**

## 2024-02-13 NOTE — PLAN OF CARE
Problem: Patient Centered Care  Goal: Patient preferences are identified and integrated in the patient's plan of care  Description: Interventions:  - What would you like us to know as we care for you? I live with my mom  -Provide timely, complete, and accurate information to patient/family  - Incorporate patient and family knowledge, values, beliefs, and cultural backgrounds into the planning and delivery of care  - Encourage patient/family to participate in care and decision-making at the level they choose  - Honor patient and family perspectives and choices  Outcome: Progressing            Patient had EGD today, full liquid diet now. No complaints of pain at this time. Patient calls appropriately.

## 2024-02-13 NOTE — OPERATIVE REPORT
ESOPHAGOGASTRODUODENOSCOPY (EGD) REPORT    Jacob Patton     2000 Age 23 year old   PCP Nj Randhawa MD Endoscopist Mayuri Reyna MD     Date of procedure: 24    Procedure: EGD w/biopsy    Pre-operative diagnosis: epigastric pain, anemia, abnormal CT    Post-operative diagnosis: see impression    Medications: MAC    Complications: none    Procedure:  Informed consent was obtained from the patient after the risks of the procedure were discussed, including but not limited to bleeding, perforation, aspiration, infection, or possibility of a missed lesion. After discussions of the risks/benefits and alternatives to this procedure, as well as the planned sedation, the patient was placed in the left lateral decubitus position and begun on continuous blood pressure pulse oximetry and EKG monitoring and this was maintained throughout the procedure. Once an adequate level of sedation was obtained a bite block was placed. Then the lubricated tip of the [id number] diagnostic video upper endoscope was inserted and advanced using direct visualization into the posterior pharynx and ultimately into the esophagus, stomach, and duodenum.    Complications: None    Findings:      1. Esophagus: Z line unremarkable. Mild LA grade B erosive esophagitis at GE junction otherwise remainder of esophagus appeared normal.    2. Stomach: We then entered the stomach. Gastric mucosa appeared normal in the forward view with no evidence of erythema, erosions, or ulcerations. There was no evidence of any luminal or submucosal masses. A retroflexed view allowed examination of the angularis, cardia and fundus and these areas also appeared normal with a non-patulous cardia. No hiatal hernia seen.  Random biopsies of the stomach (body, antrum, cardia, and incisura) were taken with cold forceps for histology.     3. Duodenum: A nearly circumferential ulcer causing luminal narrowing and edema in the duodenal bulb along the  sweep. The scope was careful navigated through this short segment narrowing, inner diameter about 12 mm, biopsied with cold forceps for histology. The second portion duodenum distal to ulcer was normal appearing with bile.     We then withdrew the instrument from the patient who tolerated the procedure well.     Impression:   1. Large duodenal bulb ulcer with luminal narrowing, biopsied  2. LA grade B erosive esophagitis    Recommend:  1. Follow-up pathology  2. Continue ppi bid  3. IV iron    >>>If tissue was sampled/removed and you have not received your pathology results either by phone or letter within 2 weeks, please call our office at [phone].    Specimens:  Gastric, duodenal    Blood loss: <1 ml      Mayuri Reyna MD  [facility]astroenter[facility]

## 2024-02-13 NOTE — INTERVAL H&P NOTE
Pre-op Diagnosis: anemia    The above referenced H&P was reviewed by Mayuri Adhikari Ma, MD on 2/13/2024, the patient was examined and no significant changes have occurred in the patient's condition since the H&P was performed.  I discussed with the patient and/or legal representative the potential benefits, risks and side effects of this procedure; the likelihood of the patient achieving goals; and potential problems that might occur during recuperation.  I discussed reasonable alternatives to the procedure, including risks, benefits and side effects related to the alternatives and risks related to not receiving this procedure.  We will proceed with procedure as planned.

## 2024-02-13 NOTE — ED QUICK NOTES
Orders for admission, patient is aware of plan and ready to go upstairs. Any questions, please call ED RN Natalya  at extension [phone].   Type of COVID test sent:  COVID Suspicion level: N/A    Titratable drug(s) infusing:  Rate:  See mar  Left ac 20    LOC at time of transport:Ax4    GI consult  RBC to transfuse  Consent given

## 2024-02-13 NOTE — CONSULTS
[facility]   Gastroenterology Consultation Note    Jacob Patton  Patient Status:    Observation  Date of Admission:         2/12/2024, Hospital day #0  Attending:   Cassie Marquez MD  PCP:     Nj Randhawa MD    Reason for Consultation:  BRIDGET    History of Present Illness:  Jacob Patton is a a(n) 23 year old male otherwise healthy, who presents with symptomatic anemia and epigastric abdominal pain.  He states over the past 5 to 6 months he is having epigastric pain and dyspepsia.  Some nausea but no emesis.  Food does not appear to worsen abdominal pain.  No unintentional weight loss.  No melena or hematochezia.  No prior EGD or colonoscopy.  Denies significant NSAID use.  No heartburn or dysphagia. No diarrhea or constipation.     History:  Past Medical History:   Diagnosis Date    ADD (attention deficit disorder)      History reviewed. No pertinent surgical history.  Family History   Problem Relation Age of Onset    No Known Problems Father     No Known Problems Sister     No Known Problems Brother       reports that he has quit smoking. His smoking use included cigarettes. He has never been exposed to tobacco smoke. He has never used smokeless tobacco. He reports current alcohol use. He reports that he does not currently use drugs after having used the following drugs: Cannabis.    Allergies:  Allergies   Allergen Reactions    Shellfish-Derived Products NAUSEA AND VOMITING     Swelling in the mouth and itchy        Medications:    Current Facility-Administered Medications:     iron sucrose (Venofer) 300 mg in sodium chloride 0.9% 250 mL IVPB, 300 mg, Intravenous, Daily    acetaminophen (Tylenol Extra Strength) tab 500 mg, 500 mg, Oral, Q4H PRN    ondansetron (Zofran) 4 MG/2ML injection 4 mg, 4 mg, Intravenous, Q6H PRN    prochlorperazine (Compazine) 10 MG/2ML injection 5 mg, 5 mg, Intravenous, Q8H PRN    temazepam (Restoril) cap 15 mg, 15 mg, Oral, Nightly PRN     pantoprazole (Protonix) 40 mg in sodium chloride 0.9% PF 10 mL IV push, 40 mg, Intravenous, Daily  Medications Prior to Admission   Medication Sig Dispense Refill Last Dose    amphetamine-dextroamphetamine 20 MG Oral Tab Take 1 tablet by mouth 2 (two) times daily.   2/12/2024    Omeprazole 40 MG Oral Capsule Delayed Release Take 1 capsule (40 mg total) by mouth daily. (Patient not taking: Reported on 2/12/2024) 90 capsule 0 Not Taking       Review of Systems:  CONSTITUTIONAL:  negative for fevers, rigors  EYES:  negative for diplopia   RESPIRATORY:  negative for severe shortness of breath  CARDIOVASCULAR:  negative for crushing sub-sternal chest pain  GASTROINTESTINAL:  see HPI  GENITOURINARY:  negative for dysuria or gross hematuria  INTEGUMENT/BREAST:  SKIN:  negative for jaundice   ALLERGIC/IMMUNOLOGIC:  negative for hay fever  ENDOCRINE:  negative for cold intolerance and heat intolerance  MUSCULOSKELETAL:  negative for joint effusion/severe erythema  NEURO: negative for dizziness or loss of conscious or paresthesias  BEHAVIOR/PSYCH:  negative for psychotic behavior    Physical Exam:    Blood pressure 112/61, pulse 77, temperature 98.6 °F (37 °C), temperature source Oral, resp. rate 18, height 5' 7\" (1.702 m), weight 148 lb (67.1 kg), SpO2 99%. Body mass index is 23.18 kg/m².    General: awake, alert and oriented, no acute distress  HEENT: moist mucus membranes  PULM: no conversational dyspnea  CARDIOVASCULAR: regular rate and rhythm, the extremities are warm and well perfused  GI: soft, non-tender, non-distended, + BS, no rebound/guarding   EXTREMITIES: no edema, moving all extremities  SKIN: no visible rash  NEURO: appropriate and interactive    Laboratory Data:  Lab Results   Component Value Date    WBC 13.3 02/13/2024    HGB 7.1 02/13/2024    HCT 27.5 02/13/2024    .0 02/13/2024    CREATSERUM 0.89 02/13/2024    BUN 6 02/13/2024     02/13/2024    K 3.8 02/13/2024     02/13/2024    CO2 23.0  02/13/2024    GLU 80 02/13/2024    CA 9.4 02/13/2024    LIP 56 02/12/2024       Imaging:  CT ABDOMEN+PELVIS(CONTRAST ONLY)(CPT=74177)    Result Date: 2/12/2024  CONCLUSION:   Diffuse wall thickening with mucosal hyperenhancement of the first portion of the duodenum, compatible with duodenitis.  Recommend correlation with endoscopy for assessment of etiology.  No evidence of perforation.  6 millimeter tasia hepatis lymph node is not enlarged by imaging criteria.  This could be reactive in the setting of duodenitis.  Small pericardial effusion, nonspecific.      Dictated by (CST): Freda Douglas MD on 2/12/2024 at 8:37 PM     Finalized by (CST): Freda Douglas MD on 2/12/2024 at 8:47 PM           Assessment & Plan   Jacob Patton is a a(n) 23 year old male otherwise healthy, who presents with symptomatic anemia and epigastric abdominal pain.  Labs on admission notable for hemoglobin 6.1 status post 1 unit PRBC with appropriate response to hemoglobin 7 1.  Iron studies consistent with profound iron deficiency anemia. CT with diffuse wall thickening of proximal duodenum. Possible peptic ulcer disease vs inflammatory process such as celiac sprue vs less likely neoplasm. No significant nsaid use.     Recommend:  - PPI 40mg IV BID  - plan EGD   - IV venofer  - s/p 1 unit prbc   - check TTG    Mayuri Reyna MD  [facility] Gastroenterology  2/13/2024    This note was partially prepared using Dragon Medical voice recognition dictation software. As a result, errors may occur. When identified, these errors have been corrected. While every attempt is made to correct errors during dictation, discrepancies may still exist.

## 2024-02-13 NOTE — PLAN OF CARE
Patient is alert & oriented x4 on RA. Vital signs stable at this time. No acute changes noted throughout shift; patient slept. Fall precautions maintained - bed alarm on, bed locked in lowest position, call light and personal belongings within reach, non-skid socks in place. Frequent rounding by nursing staff. Plan monitor hgb.     Problem: Patient Centered Care  Goal: Patient preferences are identified and integrated in the patient's plan of care  Description: Interventions:  - What would you like us to know as we care for you? From home with mom   - Provide timely, complete, and accurate information to patient/family  - Incorporate patient and family knowledge, values, beliefs, and cultural backgrounds into the planning and delivery of care  - Encourage patient/family to participate in care and decision-making at the level they choose  - Honor patient and family perspectives and choices  Outcome: Progressing     Problem: Patient/Family Goals  Goal: Patient/Family Long Term Goal  Description: Patient's Long Term Goal: To go home    Interventions:  - Follow MD orders  - See additional Care Plan goals for specific interventions  Outcome: Progressing  Goal: Patient/Family Short Term Goal  Description: Patient's Short Term Goal: Manage abd pain    Interventions:   - Follow MD orders  - See additional Care Plan goals for specific interventions  Outcome: Progressing

## 2024-02-13 NOTE — PROGRESS NOTES
Progress Note     Jacob Patton Patient Status:  Observation    2000 MRN [id number]   Location [facility] 5SW/SE Attending Cassie Marquez MD   Hosp Day # 0 PCP Nj Randhawa MD     Chief Complaint: anemia    Subjective:   S: Patient found to have anemia  S/p prbc and venofer  Denies cp, sob, dizziness  Has had melena in past, but hadn't noted it recently  Hx. Of binge drinking    Review of Systems:   10 point ROS completed and was negative, except for pertinent positive and negatives stated in subjective.    Objective:   Vital signs:  Temp:  [98.4 °F (36.9 °C)-99 °F (37.2 °C)] 98.7 °F (37.1 °C)  Pulse:  [] 94  Resp:  [10-23] 15  BP: ()/() 114/73  SpO2:  [97 %-100 %] 97 %    Wt Readings from Last 6 Encounters:   24 148 lb (67.1 kg)   24 148 lb (67.1 kg)         Physical Exam:    General: No acute distress. Alert ,         Respiratory: Clear to auscultation bilaterally. No wheezes. No rhonchi.  Cardiovascular: S1, S2. Regular rate and rhythm. No murmurs, rubs or gallops.   Abdomen: Soft, nontender, nondistended.  Positive bowel sounds. No rebound or guarding.  Neurologic: No focal neurological deficits.   Musculoskeletal: Moves all extremities.  Extremities: No edema.    Results:   Diagnostic Data:      Labs:    Labs Last 24 Hours:   BMP     CBC    Other     Na 137 Cl 107 BUN 6 Glu 80   Hb 7.1   PTT - Procal -   K 3.8 CO2 23.0 Cr 0.89   WBC 13.3 >< .0  INR - CRP -   Renal Lytes Endo    Hct 27.5   Trop - D dim -   eGFR - Ca 9.4 POC Gluc  -    LFT   pBNP - Lactic -   eGFR AA - PO4 - A1c 5.1   AST 11 APk 106 Prot 8.1  LDL -     Mg - TSH 1.322   ALT 9 T woodrow 0.3 Alb 4.6        COVID-19 Lab Results    COVID-19  No results found for: \"COVID19\"    Pro-Calcitonin  No results for input(s): \"PCT\" in the last 168 hours.    Cardiac  No results for input(s): \"TROP\", \"PBNP\" in the last 168 hours.    Creatinine Kinase  No results for input(s): \"CK\" in the last 168  hours.    Inflammatory Markers  Recent Labs   Lab 02/12/24  1554   [facility] 0.9*       Imaging: Imaging data reviewed in Epic.    Medications:    iron sucrose  300 mg Intravenous Daily    pantoprazole  40 mg Intravenous Q12H       Assessment & Plan:   ASSESSMENT / PLAN:     Acute Iron deficiency anemia  -hb 6.2 on admisssion  -s/p one unit prbc on 2/12  -CT abdomen reviewed-->diffuse wall thickening with mucosal hyper enhancement of 1st portion of duodenum  -s/p EGD with large duodenal bulb ulcer, with luminal narrowing, LA grade b erosive esophagitis  -follow-up pathology  -ppi bid  -IV iron  -start diet    Binge drinking  -counseled on cessation            Quality:  DVT Prophylaxis: not indicated  CODE status: full  Peters:    Central line: n/a      Will the patient be referred to [facility] on discharge?: yes  Estimated date of discharge: 1-2 days  Discharge is dependent on: stability of hb  At this point Mr. Patton is expected to be discharge to: home    Plan of care discussed with patient, family nursing    Coordinated care with providers and counseling re: treatment plan and workup  MDM: High, I personally reviewed the available laboratories, imaging     MDM: High complexity     CARLITA SHEN MD    Supplementary Documentation:

## 2024-02-13 NOTE — ED PROVIDER NOTES
[facility] Emergency Department Note  Patient: Jacob Patton Age: 23 year old Sex: male      MRN: [id number]  : 2000    Patient Seen in: [facility] Emergency Department    History     Chief Complaint   Patient presents with    Abnormal Labs     Stated Complaint: abnormal result    History obtained from: patient     This is a 23-year-old, no past medical history other than ADHD, presenting to the ER due to abnormal labs.  On chart review he was seen by his primary Dr. Randhawa earlier today and has been complaining of abdominal pain for the past 3 to 4 months and noticed dark stool couple of months ago as well as some shortness of breath on exertion, lightheadedness and fatigue.  Patient states that he has been having intermittent upper abdominal pain and not particular associated with certain foods ongoing for the last few months, and has intermittently noticed dark sticky stools though states he has not had anything like this nor any bloody stool for the past few days.  He has noticed he is more short of breath when he exerts himself but this resolves at rest, also has felt fatigued and lightheaded despite taking his prescribed ADHD medicine.  He denies any fevers or chills.  No hematemesis but does state that he vomited a few days ago and had what looked like coffee-ground emesis.  Denies any significant alcohol use. No personal or family hx of bleeding disorders.       Review of Systems:  Review of Systems  Positive for stated complaint: abnormal result. Constitutional and vital signs reviewed. All other systems reviewed and negative except as noted above.    Patient History:  Past Medical History:   Diagnosis Date    ADD (attention deficit disorder)        History reviewed. No pertinent surgical history.     Family History   Problem Relation Age of Onset    No Known Problems Father     No Known Problems Sister     No Known Problems Brother        Specific Social Determinants of Health:    Social History     Socioeconomic History    Marital status: Single   Tobacco Use    Smoking status: Former     Types: Cigarettes     Passive exposure: Never    Smokeless tobacco: Never   Vaping Use    Vaping Use: Every day    Substances: Nicotine    Devices: Disposable   Substance and Sexual Activity    Alcohol use: Yes     Comment: social    Drug use: Not Currently     Types: Cannabis     Social Determinants of Health     Food Insecurity: No Food Insecurity (2/12/2024)    Food Insecurity     Food Insecurity: Never true   Transportation Needs: No Transportation Needs (2/12/2024)    Transportation Needs     Lack of Transportation: No   Housing Stability: Low Risk  (2/12/2024)    Housing Stability     Housing Instability: No           PSFH elements reviewed from today and agreed except as otherwise stated in HPI.    Physical Exam     ED Triage Vitals   BP 02/12/24 1749 119/73   Pulse 02/12/24 1749 102   Resp 02/12/24 1749 19   Temp 02/12/24 1749 98.4 °F (36.9 °C)   Temp src 02/12/24 2122 Temporal   SpO2 02/12/24 1749 100 %   O2 Device 02/12/24 2314 None (Room air)       Current:/76 (BP Location: Right arm)   Pulse 76   Temp 98.4 °F (36.9 °C) (Oral)   Resp 18   Ht 170.2 cm (5' 7\")   Wt 67.1 kg   SpO2 100%   BMI 23.18 kg/m²         Physical Exam  Vitals and nursing note reviewed.   Constitutional:       General: He is not in acute distress.     Appearance: He is not toxic-appearing.   HENT:      Head: Normocephalic and atraumatic.      Right Ear: External ear normal.      Left Ear: External ear normal.      Nose: Nose normal.      Mouth/Throat:      Mouth: Mucous membranes are moist.   Eyes:      Comments: Pale conjunctivae    Cardiovascular:      Rate and Rhythm: Normal rate and regular rhythm.      Heart sounds: No murmur heard.  Pulmonary:      Effort: Pulmonary effort is normal. No respiratory distress.      Breath sounds: No wheezing or rales.   Abdominal:      General: There is no distension.       Palpations: Abdomen is soft.      Tenderness: There is abdominal tenderness. There is no right CVA tenderness, left CVA tenderness, guarding or rebound.      Comments: LLQ ttp   Genitourinary:     Rectum: Guaiac result negative.      Comments: No stool on exam   Musculoskeletal:         General: No deformity.   Skin:     General: Skin is warm and dry.      Capillary Refill: Capillary refill takes less than 2 seconds.      Coloration: Skin is pale.   Neurological:      General: No focal deficit present.      Mental Status: He is alert.         ED Course   Labs:   Labs Reviewed   BASIC METABOLIC PANEL (8) - Abnormal; Notable for the following components:       Result Value    Glucose 115 (*)     BUN/CREA Ratio 9.6 (*)     All other components within normal limits   RBC MORPHOLOGY SCAN - Abnormal; Notable for the following components:    RBC Morphology See morphology below (*)     Microcytosis 3+ (*)     Hypochromia 3+ (*)     All other components within normal limits   IRON AND TIBC - Abnormal; Notable for the following components:    Iron 13 (*)     Total Iron Binding Capacity 507 (*)     % Saturation 3 (*)     All other components within normal limits   HEMOGLOBIN - Abnormal; Notable for the following components:    HGB 7.1 (*)     All other components within normal limits   CBC W/ DIFFERENTIAL - Abnormal; Notable for the following components:    RBC 4.29 (*)     HGB 6.2 (*)     HCT 25.7 (*)     MCV 59.9 (*)     MCH 14.5 (*)     MCHC 24.1 (*)     RDW 20.3 (*)     .0 (*)     All other components within normal limits   LIPASE - Normal   CBC WITH DIFFERENTIAL WITH PLATELET    Narrative:     The following orders were created for panel order CBC With Differential With Platelet.  Procedure                               Abnormality         Status                     ---------                               -----------         ------                     CBC W/ DIFFERENTIAL[[id number]]          Abnormal            Final  result                 Please view results for these tests on the individual orders.   BASIC METABOLIC PANEL (8)   CBC WITH DIFFERENTIAL WITH PLATELET   TYPE AND SCREEN    Narrative:     The following orders were created for panel order Type and screen.  Procedure                               Abnormality         Status                     ---------                               -----------         ------                     ABORH (Blood Type)[[id number]]                               Final result               Antibody Screen[[id number]]                                  Final result                 Please view results for these tests on the individual orders.   ABORH (BLOOD TYPE)   ANTIBODY SCREEN   ABORH CONFIRMATION   PREPARE RBC     Radiology findings:  I personally reviewed the images.   CT ABDOMEN+PELVIS(CONTRAST ONLY)(CPT=74177)    Result Date: 2/12/2024  CONCLUSION:   Diffuse wall thickening with mucosal hyperenhancement of the first portion of the duodenum, compatible with duodenitis.  Recommend correlation with endoscopy for assessment of etiology.  No evidence of perforation.  6 millimeter tasia hepatis lymph node is not enlarged by imaging criteria.  This could be reactive in the setting of duodenitis.  Small pericardial effusion, nonspecific.      Dictated by (CST): Freda Douglas MD on 2/12/2024 at 8:37 PM     Finalized by (CST): Freda Douglas MD on 2/12/2024 at 8:47 PM           Cardiac Monitor: Interpreted by me.   Pulse Readings from Last 1 Encounters:   02/12/24 76       MDM   This is a 23-year-old, no past medical history other than ADHD, presenting to the ER due to abnormal labs.  On chart review he was seen by his primary Dr. Randhawa earlier today and has been complaining of abdominal pain for the past 3 to 4 months and noticed dark stool couple of months ago as well as some shortness of breath on exertion, lightheadedness and fatigue.    Patient borderline tachycardic here, slightly pale  appearing but in no distress stress, has mild left lower quadrant tenderness but no epigastric or right upper quadrant tenderness on exam, no rebound or guarding.  Guaiac negative though there was no stool on rectal exam.    This patient presents with abdominal pain. Exam is as above.     Differential diagnosis includes:    Upper GI pathology including gastritis, PUD, cholecystitis, choledocholithiasis, pancreatitis, biliary colic, SBO     Lower suspicion for lower GI pathology including diverticulitis, appendicitis, intraabdominal abscess, volvulus, diverticular bleed given no BRBPR low suspicion    Anemia, thrombocytopenia    Plan: will obtain cbc, cmp, T&S, lipase, CTAP for further disposition. Will give IV protonix. Will likely consult GI given symptomatic anemia and abdominal pain for observation for EGD       ED Course as of 02/13/24 0208  ------------------------------------------------------------  Time: 02/12 1827  Comment: Call from lab, pt with Hb of 6.2, otherwise wbc wnl and slight thrombcytosis of 570K  ------------------------------------------------------------  Time: 02/12 1843  Value: Glucose(!): 115  Comment: (Reviewed)  ------------------------------------------------------------  Time: 02/12 1848  Comment: Paged GI Dr. Ellis recommends npo, Iv protonix, NPO midnight for EGD/poss scope tomorrow   ------------------------------------------------------------  Time: 02/12 1904  Comment: Case d/w Dr. Marquez accepts admission pending CT   ------------------------------------------------------------  Time: 02/12 2055  Value: CT ABDOMEN+PELVIS(CONTRAST ONLY)(CPT=74177)  Comment: CONCLUSION:      Diffuse wall thickening with mucosal hyperenhancement of the first portion of the duodenum, compatible with duodenitis.  Recommend correlation with endoscopy for assessment of etiology.  No evidence of perforation.      6 millimeter tasia hepatis lymph node is not enlarged by imaging criteria.  This could be  reactive in the setting of duodenitis.      Small pericardial effusion, nonspecific.         ------------------------------------------------------------  Time: 02/12 2134  Comment: Pt updated with imaging results.             Procedures:  Procedures    Critical Care:  Due to a high probability of clinically significant, life threatening deterioration, the patient required my highest level of preparedness to intervene emergently and I personally spent 30 minutes of critical care time directly and personally managing the patient. This critical care time included obtaining a history; examining the patient; pulse oximetry; ordering and review of studies; arranging urgent treatment with development of a management plan; evaluation of patient's response to treatment; frequent reassessment; and, discussions with other providers.    This critical care time was performed to assess and manage the high probability of imminent, life-threatening deterioration that could result in multi-organ failure. It was exclusive of separately billable procedures and treating other patients and teaching time.      Disposition and Plan     Clinical Impression:  1. Anemia, unspecified type    2. Duodenitis    3. Pericardial effusion        Disposition:  Admit    Follow-up:  No follow-up provider specified.    Medications Prescribed:  Current Discharge Medication List          Hospital Problems       Present on Admission  Date Reviewed: 2/12/2024            ICD-10-CM Noted POA    * (Principal) Anemia, unspecified type D64.9 2/12/2024 Unknown    Duodenitis K29.80 2/12/2024 Unknown    Pericardial effusion I31.39 2/12/2024 Unknown        This note may have been created using voice dictation technology and may include inadvertent errors.      Rozina Herrmann DO  Attending Physician   Emergency Medicine

## 2024-02-13 NOTE — H&P
[facility]    PATIENT'S NAME: EVERARDO RAM   ATTENDING PHYSICIAN: Rozina Herrmann DO   PATIENT ACCOUNT#:   [id number]    LOCATION:  [address]  MEDICAL RECORD #:   [id number]       YOB: 2000  ADMISSION DATE:       02/12/2024    HISTORY AND PHYSICAL EXAMINATION    CHIEF COMPLAINT:  Profound iron deficiency anemia.    HISTORY OF PRESENT ILLNESS:  Patient is a 23-year-old  male, presented to the emergency department for evaluation of progressive fatigue for the last several weeks.  CBC showed hemoglobin of 6.2, MCV 59.9, platelet count 570.  Iron level still pending.  Chemistry was unremarkable.  CT scan of the abdomen still pending.  Blood transfusion was ordered, and patient will be admitted to the hospital for further observation.     PAST MEDICAL HISTORY:  Attention deficit disorder.     PAST SURGICAL HISTORY:  None.    MEDICATIONS:  Currently non.    ALLERGIES:  No known drug allergies.     FAMILY HISTORY:  Parents are alive.  No significant medical problems.    SOCIAL HISTORY:  He used to be a heavy alcohol drinker but he stopped doing that several months ago.  He denies any substance use or tobacco use.      REVIEW OF SYSTEMS:  Patient said he has been feeling decreased energy and fatigue for the last several weeks to months.  Also, up until a few weeks ago, he has been seeing on-and-off black bowel movements.  He has chronic epigastric discomfort and dyspepsia.  He denies any nausea or vomiting.  No significant weight loss.  Other 12-point review of systems is negative.       PHYSICAL EXAMINATION:    GENERAL:  Alert and oriented to time, place and person.  Pale in color.  No acute distress.   VITAL SIGNS:  Temperature 98.4, pulse 102, respiratory rate 19, blood pressure 119/73, pulse ox 100% on room air.  HEENT:  Atraumatic.  Oropharynx clear.  Moist mucous membranes.  Ears and nose normal.  Eyes:  Anicteric sclerae.    NECK:  Supple.  No lymphadenopathy.   Trachea midline.  Full range of motion.   LUNGS:  Clear to auscultation bilaterally.  Normal respiratory effort.    HEART:  Regular rate and rhythm.  S1 and S2 auscultated.  No murmur.    ABDOMEN:  Soft, nondistended.  No tenderness.  Positive bowel sounds.   EXTREMITIES:  No peripheral edema, clubbing or cyanosis.   NEUROLOGIC:  Motor and sensory intact.      ASSESSMENT:  Profound microcytic anemia suggestive of iron deficiency.  History also suggestive of possible peptic ulcer disease and intermittent blood loss.    PLAN:  Patient will be admitted to general medical floor.  Obtain iron level.  IV Venofer.  Blood transfusion.  Gastroenterology consult.  IV Protonix.  Clear liquid diet.  Further recommendations to follow.     Dictated By Cassie Marquez MD  d: 02/12/2024 19:24:20  t: 02/12/2024 20:21:19  Job [id number]  FB/    cc: Rozina Herrmann DO

## 2024-02-14 ENCOUNTER — TELEPHONE (OUTPATIENT)
Facility: CLINIC | Age: 24
End: 2024-02-14

## 2024-02-14 VITALS
HEART RATE: 94 BPM | BODY MASS INDEX: 23.23 KG/M2 | SYSTOLIC BLOOD PRESSURE: 105 MMHG | OXYGEN SATURATION: 99 % | TEMPERATURE: 98 F | WEIGHT: 148 LBS | HEIGHT: 67 IN | RESPIRATION RATE: 16 BRPM | DIASTOLIC BLOOD PRESSURE: 59 MMHG

## 2024-02-14 PROBLEM — I31.39 PERICARDIAL EFFUSION (HCC): Status: RESOLVED | Noted: 2024-02-12 | Resolved: 2024-02-14

## 2024-02-14 LAB
ANION GAP SERPL CALC-SCNC: 6 MMOL/L (ref 0–18)
BASOPHILS # BLD AUTO: 0.09 X10(3) UL (ref 0–0.2)
BASOPHILS NFR BLD AUTO: 0.8 %
BLOOD TYPE BARCODE: 5100
BUN BLD-MCNC: 6 MG/DL (ref 9–23)
BUN/CREAT SERPL: 6.7 (ref 10–20)
CALCIUM BLD-MCNC: 9.3 MG/DL (ref 8.7–10.4)
CHLORIDE SERPL-SCNC: 107 MMOL/L (ref 98–112)
CO2 SERPL-SCNC: 26 MMOL/L (ref 21–32)
CREAT BLD-MCNC: 0.89 MG/DL
DEPRECATED RDW RBC AUTO: 48 FL (ref 35.1–46.3)
EGFRCR SERPLBLD CKD-EPI 2021: 124 ML/MIN/1.73M2 (ref 60–?)
EOSINOPHIL # BLD AUTO: 0.37 X10(3) UL (ref 0–0.7)
EOSINOPHIL NFR BLD AUTO: 3.2 %
ERYTHROCYTE [DISTWIDTH] IN BLOOD BY AUTOMATED COUNT: 22.7 % (ref 11–15)
GLUCOSE BLD-MCNC: 83 MG/DL (ref 70–99)
H PYLORI BREATH TEST: NEGATIVE
HCT VFR BLD AUTO: 28 %
HGB A2 MFR BLD: 1.7 % (ref 1.5–3.5)
HGB BLD-MCNC: 7.2 G/DL
HGB PNL BLD ELPH: 98.3 % (ref 95.5–100)
IGA SERPL-MCNC: 384.1 MG/DL (ref 40–350)
IGM SERPL-MCNC: 87.3 MG/DL (ref 50–300)
IMM GRANULOCYTES # BLD AUTO: 0.06 X10(3) UL (ref 0–1)
IMM GRANULOCYTES NFR BLD: 0.5 %
IMMUNOGLOBULIN PNL SER-MCNC: 1676 MG/DL (ref 650–1600)
LYMPHOCYTES # BLD AUTO: 1.91 X10(3) UL (ref 1–4)
LYMPHOCYTES NFR BLD AUTO: 16.3 %
MAGNESIUM SERPL-MCNC: 2 MG/DL (ref 1.6–2.6)
MCH RBC QN AUTO: 16 PG (ref 26–34)
MCHC RBC AUTO-ENTMCNC: 25.7 G/DL (ref 31–37)
MCV RBC AUTO: 62.2 FL
MONOCYTES # BLD AUTO: 1.14 X10(3) UL (ref 0.1–1)
MONOCYTES NFR BLD AUTO: 9.7 %
NEUTROPHILS # BLD AUTO: 8.15 X10 (3) UL (ref 1.5–7.7)
NEUTROPHILS # BLD AUTO: 8.15 X10(3) UL (ref 1.5–7.7)
NEUTROPHILS NFR BLD AUTO: 69.5 %
OSMOLALITY SERPL CALC.SUM OF ELEC: 285 MOSM/KG (ref 275–295)
PLATELET # BLD AUTO: 490 10(3)UL (ref 150–450)
POTASSIUM SERPL-SCNC: 3.8 MMOL/L (ref 3.5–5.1)
RBC # BLD AUTO: 4.5 X10(6)UL
SODIUM SERPL-SCNC: 139 MMOL/L (ref 136–145)
UNIT VOLUME: 350 ML
WBC # BLD AUTO: 11.7 X10(3) UL (ref 4–11)

## 2024-02-14 PROCEDURE — 99233 SBSQ HOSP IP/OBS HIGH 50: CPT | Performed by: INTERNAL MEDICINE

## 2024-02-14 PROCEDURE — 99239 HOSP IP/OBS DSCHRG MGMT >30: CPT | Performed by: HOSPITALIST

## 2024-02-14 RX ORDER — MELATONIN
325
Qty: 60 TABLET | Refills: 0 | Status: SHIPPED | OUTPATIENT
Start: 2024-02-14 | End: 2024-04-14

## 2024-02-14 RX ORDER — PANTOPRAZOLE SODIUM 40 MG/1
40 TABLET, DELAYED RELEASE ORAL
Qty: 120 TABLET | Refills: 0 | Status: SHIPPED | OUTPATIENT
Start: 2024-02-14 | End: 2024-04-14

## 2024-02-14 RX ORDER — PANTOPRAZOLE SODIUM 40 MG/1
40 TABLET, DELAYED RELEASE ORAL
Status: DISCONTINUED | OUTPATIENT
Start: 2024-02-14 | End: 2024-02-14

## 2024-02-14 NOTE — DISCHARGE SUMMARY
[facility]    Discharge Summary    Jacob Patton Patient Status:  Inpatient    2000 MRN [id number]   Location [facility] 5SW/SE Attending Hi Mak MD   Hosp Day # 0 PCP Nj Randhawa MD     Date of Admission: 2024     Date of Discharge: 24     Hospital Discharge Diagnoses:  # Acute  blood loss anemia due to upper GI bleed  # Iron deficiency anemia    Lace+ Score: 25  59-90 High Risk  29-58 Medium Risk  0-28   Low Risk.    TCM Follow-Up Recommendation:  LACE < 29: Low Risk of readmission after discharge from the hospital. No TCM follow-up needed.    HPI Per Admitting Physician: Patient is a 23-year-old  male, presented to the emergency department for evaluation of progressive fatigue for the last several weeks. CBC showed hemoglobin of 6.2, MCV 59.9, platelet count 570. Iron level still pending. Chemistry was unremarkable. CT scan of the abdomen still pending. Blood transfusion was ordered, and patient will be admitted to the hospital for further observation.     Hospital Course: Patient is a 23-year-old  male with hx of alcohol abuse, who p/w progressive fatigue for several weeks.      # Acute  blood loss anemia due to upper GI bleed  # Iron deficiency anemia  - CT abdomen reviewed-->diffuse wall thickening with mucosal hyper enhancement of 1st portion of duodenum  - hgb 6.2 on admission, 1U PRBC on , hgb 7.2 on   - s/p EGD with large duodenal bulb ulcer, with luminal narrowing, LA grade b erosive esophagitis  - follow-up pathology  - About 1.8 g iron deficit: IV iron, received 800 mg elemental iron this admission. Ferrous sulphate daily at discharge.  - GI rec PPI BID for 8 weeks, outpatient follow up.     # Alcohol abuse with binge drinking  - counseled on cessation    Dispo: home      Physical Examination:  Vital Signs:  Blood pressure 105/59, pulse 94, temperature 98.2 °F (36.8 °C), temperature source Oral, resp.  rate 16, height 5' 7\" (1.702 m), weight 148 lb (67.1 kg), SpO2 99%.     GENERAL:  The patient appeared to be in no distress.    SKIN:  Warm and hydrated  HEENT:  Head was atraumatic and normocephalic.    NECK:  Supple.  There was no JVD.   HEART:  S1 and S2 heard.  RRR   LUNGS:  Air entry was good.  No crackles or wheezes   ABDOMEN: Soft and non-tender.  Bowel sounds were present.  MUSCULOSKELETAL:  There was no deformity.    EXTREMITIES: There was no edema, clubbing or cyanosis  NEUROLOGICAL:  There was no focal deficit.  Cranial nerves II through XII were intact.  PSYCHIATRIC: Calm and cooperative     CULTURE:   No results found for this visit on 02/12/24.    IMAGING STUDIES:   CT ABDOMEN+PELVIS(CONTRAST ONLY)(CPT=74177)    Result Date: 2/12/2024  CONCLUSION:   Diffuse wall thickening with mucosal hyperenhancement of the first portion of the duodenum, compatible with duodenitis.  Recommend correlation with endoscopy for assessment of etiology.  No evidence of perforation.  6 millimeter tasia hepatis lymph node is not enlarged by imaging criteria.  This could be reactive in the setting of duodenitis.  Small pericardial effusion, nonspecific.      Dictated by (CST): Freda Douglas MD on 2/12/2024 at 8:37 PM     Finalized by (CST): Freda Douglas MD on 2/12/2024 at 8:47 PM           LABS :     Lab Results   Component Value Date    WBC 11.7 (H) 02/14/2024    HGB 7.2 (L) 02/14/2024    HCT 28.0 (L) 02/14/2024    .0 (H) 02/14/2024    CREATSERUM 0.89 02/14/2024    BUN 6 (L) 02/14/2024     02/14/2024    K 3.8 02/14/2024     02/14/2024    CO2 26.0 02/14/2024    GLU 83 02/14/2024    CA 9.3 02/14/2024    ALB 4.6 02/12/2024    ALKPHO 106 02/12/2024    BILT 0.3 02/12/2024    TP 8.1 02/12/2024    AST 11 02/12/2024    ALT 9 (L) 02/12/2024    TSH 1.322 02/12/2024    LIP 56 02/12/2024    MG 2.0 02/14/2024       Recent Labs   Lab 02/12/24  1803 02/12/24  2332 02/13/24  0535 02/14/24  0619   RBC 4.29*  --  4.47  4.50   HGB 6.2* 7.1* 7.1* 7.2*   HCT 25.7*  --  27.5* 28.0*   MCV 59.9*  --  61.5* 62.2*   MCH 14.5*  --  15.9* 16.0*   MCHC 24.1*  --  25.8* 25.7*   RDW 20.3*  --  22.6* 22.7*   NEPRELIM 6.57  --  9.71* 8.15*   WBC 10.3  --  13.3* 11.7*   .0*  --  486.0* 490.0*     Recent Labs   Lab 02/12/24  1554 02/12/24  1803 02/13/24  0535 02/14/24  0619   GLU 84 115* 80 83   BUN 9 9 6* 6*   CREATSERUM 0.93 0.94 0.89 0.89   CA 9.4 9.5 9.4 9.3   ALB 4.6  --   --   --     138 137 139   K 4.1 3.9 3.8 3.8    106 107 107   CO2 25.0 24.0 23.0 26.0   ALKPHO 106  --   --   --    AST 11  --   --   --    ALT 9*  --   --   --    BILT 0.3  --   --   --    TP 8.1  --   --   --      No results found for: \"PT\", \"INR\"    Discharge Medications:      Discharge Medications        START taking these medications        Instructions Prescription details   ferrous sulfate 325 (65 FE) MG Tbec      Take 1 tablet (325 mg total) by mouth daily with breakfast.   Stop taking on: April 14, 2024  Quantity: 60 tablet  Refills: 0     pantoprazole 40 MG Tbec  Commonly known as: Protonix      Take 1 tablet (40 mg total) by mouth 2 (two) times daily before meals.   Stop taking on: April 14, 2024  Quantity: 120 tablet  Refills: 0            CONTINUE taking these medications        Instructions Prescription details   amphetamine-dextroamphetamine 20 MG Tabs  Commonly known as: Adderall      Take 1 tablet by mouth 2 (two) times daily.   Refills: 0            STOP taking these medications      Omeprazole 40 MG Cpdr                  Where to Get Your Medications        These medications were sent to [id number] - [place], [place] - [address] AT [facility] & [facility], [phone], [phone] [address], [facility] [address]      Phone: [phone]   ferrous sulfate 325 (65 FE) MG Tbec  pantoprazole 40 MG Tbec         Follow up Visits  Mayuri Reyna MD  [address]  [facility]  [address]  [phone]    Follow up      Nj Randhawa MD  [address]  [facility] [address]  [phone]    Follow up in 1 week(s)        Consultants         Provider   Role Specialty     Vee Ellis MD  Consulting Physician GASTROENTEROLOGY            ----------------------------------------------------  35 MIN SPENT ON THIS DISCHARGE   2/14/2024  1:31 PM    Hi Mak MD, PhD  [facility]

## 2024-02-14 NOTE — PAYOR COMM NOTE
--------------  ADMISSION REVIEW     Payor: [employer] [facility] PPO  Subscriber #:  [id number]  Authorization Number: [id number]    Admit date: 2/14/24  Admit time: 12:33 PM  Admit Orders (From admission, onward)       Start     Ordered    02/14/24 1233  Admit to inpatient Once  Once        Ordering Provider: Hi Mak MD   Question:  Diagnosis  Answer:  Anemia, unspecified type    02/14/24 1232    02/12/24 2314  Place in observation Once  Once        Ordering Provider: Cassie Marquez MD   Question:  Diagnosis  Answer:  Anemia, unspecified type    02/12/24 2313                History   This is a 23-year-old, no past medical history other than ADHD, presenting to the ER due to abnormal labs.  On chart review he was seen by his primary Dr. Randhawa earlier today and has been complaining of abdominal pain for the past 3 to 4 months and noticed dark stool couple of months ago as well as some shortness of breath on exertion, lightheadedness and fatigue.  Patient states that he has been having intermittent upper abdominal pain and not particular associated with certain foods ongoing for the last few months, and has intermittently noticed dark sticky stools though states he has not had anything like this nor any bloody stool for the past few days.  He has noticed he is more short of breath when he exerts himself but this resolves at rest, also has felt fatigued and lightheaded despite taking his prescribed ADHD medicine.  He denies any fevers or chills.  No hematemesis but does state that he vomited a few days ago and had what looked like coffee-ground emesis.  Denies any significant alcohol use. No personal or family hx of bleeding disorders.     ED Triage Vitals   BP 02/12/24 1749 119/73   Pulse 02/12/24 1749 102   Resp 02/12/24 1749 19   Temp 02/12/24 1749 98.4 °F (36.9 °C)   Temp src 02/12/24 2122 Temporal   SpO2 02/12/24 1749 100 %   O2 Device 02/12/24 2314 None (Room air)     Physical Exam  HENT:      Head:  Normocephalic and atraumatic.      Right Ear: External ear normal.      Left Ear: External ear normal.      Nose: Nose normal.      Mouth/Throat:      Mouth: Mucous membranes are moist.   Eyes:      Comments: Pale conjunctivae    Cardiovascular:      Rate and Rhythm: Normal rate and regular rhythm.      Heart sounds: No murmur heard.  Pulmonary:      Effort: Pulmonary effort is normal. No respiratory distress.      Breath sounds: No wheezing or rales.   Abdominal:      General: There is no distension.      Palpations: Abdomen is soft.      Tenderness: There is abdominal tenderness. There is no right CVA tenderness, left CVA tenderness, guarding or rebound.      Comments: LLQ ttp   Genitourinary:     Rectum: Guaiac result negative.      Comments: No stool on exam   Musculoskeletal:         General: No deformity.   Skin:     General: Skin is warm and dry.      Capillary Refill: Capillary refill takes less than 2 seconds.      Coloration: Skin is pale.   Neurological:      General: No focal deficit present.      Mental Status: He is alert.   Labs Reviewed   BASIC METABOLIC PANEL (8) - Abnormal; Notable for the following components:       Result Value    Glucose 115 (*)     BUN/CREA Ratio 9.6 (*)     All other components within normal limits   RBC MORPHOLOGY SCAN - Abnormal; Notable for the following components:    RBC Morphology See morphology below (*)     Microcytosis 3+ (*)     Hypochromia 3+ (*)     All other components within normal limits   IRON AND TIBC - Abnormal; Notable for the following components:    Iron 13 (*)     Total Iron Binding Capacity 507 (*)     % Saturation 3 (*)     All other components within normal limits   HEMOGLOBIN - Abnormal; Notable for the following components:    HGB 7.1 (*)     All other components within normal limits   CBC W/ DIFFERENTIAL - Abnormal; Notable for the following components:    RBC 4.29 (*)     HGB 6.2 (*)     HCT 25.7 (*)     MCV 59.9 (*)     MCH 14.5 (*)     MCHC 24.1  (*)     RDW 20.3 (*)     .0 (*)      CT ABDOMEN+PELVIS(CONTRAST ONLY)(CPT=74177)  Result Date: 2/12/2024  CONCLUSION:   Diffuse wall thickening with mucosal hyperenhancement of the first portion of the duodenum, compatible with duodenitis.  Recommend correlation with endoscopy for assessment of etiology.  No evidence of perforation.  6 millimeter tasia hepatis lymph node is not enlarged by imaging criteria.  This could be reactive in the setting of duodenitis.  Small pericardial effusion, nonspecific.      Dictated by (CST): Freda Douglas MD on 2/12/2024 at 8:37 PM     Finalized by (CST): Freda Douglas MD on 2/12/2024 at 8:47 PM           Disposition and Plan   Clinical Impression:  1. Anemia, unspecified type    2. Duodenitis    3. Pericardial effusion      Disposition:  Admit      HISTORY AND PHYSICAL EXAMINATION     CHIEF COMPLAINT:  Profound iron deficiency anemia.     HISTORY OF PRESENT ILLNESS:  Patient is a 23-year-old  male, presented to the emergency department for evaluation of progressive fatigue for the last several weeks.  CBC showed hemoglobin of 6.2, MCV 59.9, platelet count 570.  Iron level still pending.  Chemistry was unremarkable.  CT scan of the abdomen still pending.  Blood transfusion was ordered, and patient will be admitted to the hospital for further observation.      PAST MEDICAL HISTORY:  Attention deficit disorder.   PAST SURGICAL HISTORY:  None.  FAMILY HISTORY:  Parents are alive.    REVIEW OF SYSTEMS:  Patient said he has been feeling decreased energy and fatigue for the last several weeks to months.  Also, up until a few weeks ago, he has been seeing on-and-off black bowel movements.  He has chronic epigastric discomfort and dyspepsia.  He denies any nausea or vomiting.  No significant weight loss.        PHYSICAL EXAMINATION:    GENERAL:  Alert and oriented to time, place and person.  Pale in color.     VITAL SIGNS:  Temperature 98.4, pulse 102,  respiratory rate 19, blood pressure 119/73, pulse ox 100% on room air.  HEENT:  Atraumatic.  Oropharynx clear.  Moist mucous membranes.  Ears and nose normal.  Eyes:  Anicteric sclerae.    NECK:  Supple.  No lymphadenopathy.  Trachea midline.  Full range of motion.   LUNGS:  Clear to auscultation bilaterally.  Normal respiratory effort.    HEART:  Regular rate and rhythm.  S1 and S2 auscultated.  No murmur.    ABDOMEN:  Soft, nondistended.  No tenderness.  Positive bowel sounds.   EXTREMITIES:  No peripheral edema, clubbing or cyanosis.   NEUROLOGIC:  Motor and sensory intact.       ASSESSMENT:  Profound microcytic anemia suggestive of iron deficiency.  History also suggestive of possible peptic ulcer disease and intermittent blood loss.  PLAN:  Patient will be admitted to general medical floor.  Obtain iron level.  IV Venofer.  Blood transfusion.  Gastroenterology consult.  IV Protonix.  Clear liquid diet.  Further recommendations to follow.        2/14/24  S: Patient found to have anemia  S/p prbc and venofer  Denies cp, sob, dizziness  He feels well, no abd pain or nausea.     Temp:  [98 °F (36.7 °C)-99.2 °F (37.3 °C)] 98 °F (36.7 °C)  Pulse:  [] 83  Resp:  [10-23] 16  BP: ()/() 105/66  SpO2:  [97 %-100 %] 99 %  Labs Last 24 Hours:                  BMP         CBC       Na 139 Cl 107 BUN 6 Glu 83     Hb 7.2       K 3.8 CO2 26.0 Cr 0.89     WBC 11.7 >< .0      Lytes        Hct 28.0        Ca 9.3               Mg 2.0                ASSESSMENT / PLAN:   Patient is a 23-year-old  male with hx of alcohol abuse, who p/w progressive fatigue for several weeks.      # Acute  blood loss anemia due to upper GI bleed  # Iron deficiency anemia  - CT abdomen reviewed-->diffuse wall thickening with mucosal hyper enhancement of 1st portion of duodenum  - hgb 6.2 on admission, 1U PRBC on 2/12, hgb 7.2 on 2/14  - s/p EGD with large duodenal bulb ulcer, with luminal narrowing, LA grade b  erosive esophagitis  - follow-up pathology  - Protonix bid  - About 1.8 g iron deficit: IV iron, received 800 mg elemental iron this admission.  - tolerated low fiber diet, stop IVF  - GI following     # Alcohol abuse with binge drinking  - counseled on cessation     Quality:  DVT Prophylaxis: not indicated            MEDICATIONS ADMINISTERED IN LAST 1 DAY:  acetaminophen (Tylenol Extra Strength) tab 500 mg       Date Action Dose Route User    2/13/2024 2149 Given 500 mg Oral BatresCait ferris          lidocaine PF (Xylocaine-MPF) 1% injection       Date Action Dose Route User    2/13/2024 1300 Given 50 mg Intravenous Juanis Ceja MD          pantoprazole (Protonix) 40 mg in sodium chloride 0.9% PF 10 mL IV push       Date Action Dose Route User    2/13/2024 2148 Given 40 mg Intravenous BatresCait ferris          pantoprazole (Protonix) DR tab 40 mg       Date Action Dose Route User    2/14/2024 0955 Given 40 mg Oral Ni Ross RN          propofol (Diprivan) 10 MG/ML injection       Date Action Dose Route User    2/13/2024 1304 Given 50 mg Intravenous Juanis Ceja MD    2/13/2024 1303 Given 50 mg Intravenous Juanis Ceja MD          propofol (Diprivan) 10 mg/mL infusion premix       Date Action Dose Route User    2/13/2024 1300 New Bag 200 mcg/kg/min × 67.1 kg Intravenous Juanis Ceja MD          iron sucrose (Venofer) 300 mg in sodium chloride 0.9% 250 mL IVPB       Date Action Dose Route User    2/14/2024 0955 New Bag 300 mg Intravenous Ni Ross RN            Vitals (last day)       Date/Time Temp Pulse Resp BP SpO2 Weight O2 Device O2 Flow Rate (L/min) [facility]    02/14/24 0921 98.2 °F (36.8 °C) 94 16 105/59 -- -- None (Room air) -- GM    02/14/24 0432 98 °F (36.7 °C) -- 16 105/66 99 % -- None (Room air) -- T    02/13/24 2142 99.2 °F (37.3 °C) 83 16 116/70 100 % -- None (Room air) -- T    02/13/24 1651 98.6 °F (37 °C) 82 16 107/59 100 % -- None (Room air) -- GM    02/13/24 1325 -- 94 15  114/73 97 % -- -- -- SR    02/13/24 1320 -- 103 10 133/107 -- -- -- -- SR    02/13/24 1317 -- 113 23 149/69 98 % -- None (Room air) -- SR    02/13/24 1200 98.7 °F (37.1 °C) 83 -- 99/51 -- -- None (Room air) -- CA    02/13/24 0952 98.6 °F (37 °C) 77 18 112/61 99 % -- None (Room air) -- GM    02/13/24 0536 98.6 °F (37 °C) 75 18 123/68 99 % -- None (Room air) --           CIWA Scores (since admission)       None          Blood Transfusion Record       Product Unit Status Volume Start End            Transfuse RBC       24  [id number]  [id number] Stopped  02/12/24 2011 02/12/24 0818

## 2024-02-14 NOTE — TELEPHONE ENCOUNTER
Pro Raza,    Can I use a res24 for this patient? I put Dr. Reyna's discharge recommendations below.    Thank you!

## 2024-02-14 NOTE — PROGRESS NOTES
Progress Note     Jacob Patton Patient Status:  Observation    2000 MRN [id number]   Location [facility] 5SW/SE Attending Cassie Marquez MD   Hosp Day # 0 PCP Nj Randhawa MD     Chief Complaint: anemia    Subjective:   S: Patient found to have anemia  S/p prbc and venofer  Denies cp, sob, dizziness  He feels well, no abd pain or nausea.    Review of Systems:   10 point ROS completed and was negative, except for pertinent positive and negatives stated in subjective.    Objective:   Vital signs:  Temp:  [98 °F (36.7 °C)-99.2 °F (37.3 °C)] 98 °F (36.7 °C)  Pulse:  [] 83  Resp:  [10-23] 16  BP: ()/() 105/66  SpO2:  [97 %-100 %] 99 %    Wt Readings from Last 6 Encounters:   24 148 lb (67.1 kg)   24 148 lb (67.1 kg)         Physical Exam:    General: No acute distress. Alert ,         Respiratory: Clear to auscultation bilaterally. No wheezes. No rhonchi.  Cardiovascular: S1, S2. Regular rate and rhythm. No murmurs, rubs or gallops.   Abdomen: Soft, nontender, nondistended.  Positive bowel sounds. No rebound or guarding.  Neurologic: No focal neurological deficits.   Musculoskeletal: Moves all extremities.  Extremities: No edema.    Results:   Diagnostic Data:      Labs:    Labs Last 24 Hours:   BMP     CBC    Other     Na 139 Cl 107 BUN 6 Glu 83   Hb 7.2   PTT - Procal -   K 3.8 CO2 26.0 Cr 0.89   WBC 11.7 >< .0  INR - CRP -   Renal Lytes Endo    Hct 28.0   Trop - D dim -   eGFR - Ca 9.3 POC Gluc  -    LFT   pBNP - Lactic -   eGFR AA - PO4 - A1c -   AST - APk - Prot -  LDL -     Mg 2.0 TSH -   ALT - T woodrow - Alb -        COVID-19 Lab Results    COVID-19  No results found for: \"COVID19\"    Pro-Calcitonin  No results for input(s): \"PCT\" in the last 168 hours.    Cardiac  No results for input(s): \"TROP\", \"PBNP\" in the last 168 hours.    Creatinine Kinase  No results for input(s): \"CK\" in the last 168 hours.    Inflammatory Markers  Recent Labs   Lab  02/12/24  1554   [facility] 0.9*       Imaging: Imaging data reviewed in Epic.    Medications:    iron sucrose  300 mg Intravenous Daily    pantoprazole  40 mg Intravenous Q12H       Assessment & Plan:   ASSESSMENT / PLAN:   Patient is a 23-year-old  male with hx of alcohol abuse, who p/w progressive fatigue for several weeks.     # Acute  blood loss anemia due to upper GI bleed  # Iron deficiency anemia  - CT abdomen reviewed-->diffuse wall thickening with mucosal hyper enhancement of 1st portion of duodenum  - hgb 6.2 on admission, 1U PRBC on 2/12, hgb 7.2 on 2/14  - s/p EGD with large duodenal bulb ulcer, with luminal narrowing, LA grade b erosive esophagitis  - follow-up pathology  - Protonix bid  - About 1.8 g iron deficit: IV iron, received 800 mg elemental iron this admission.  - tolerated low fiber diet, stop IVF  - GI following    # Alcohol abuse with binge drinking  - counseled on cessation    Quality:  DVT Prophylaxis: not indicated  CODE status: full  Peters:    Central line: n/a    Will the patient be referred to [facility] on discharge?: yes  Estimated date of discharge: 1-2 days  Discharge is dependent on: stability of hb  At this point Mr. Patton is expected to be discharge to: home    Coordinated care with providers and counseling re: treatment plan and workup  MDM: High, I personally reviewed the available laboratories, imaging      Hi Mak MD, PhD  Message via Secure Chat  [facility] Hospitalist

## 2024-02-14 NOTE — PLAN OF CARE
Problem: Patient Centered Care  Goal: Patient preferences are identified and integrated in the patient's plan of care  Description: Interventions:  - What would you like us to know as we care for you? From home with family   - Provide timely, complete, and accurate information to patient/family  - Incorporate patient and family knowledge, values, beliefs, and cultural backgrounds into the planning and delivery of care  - Encourage patient/family to participate in care and decision-making at the level they choose  - Honor patient and family perspectives and choices  Outcome: Progressing     Problem: Patient/Family Goals  Goal: Patient/Family Long Term Goal  Description: Patient's Long Term Goal: Monitor hemoglobin     Interventions:  - Monitor vitals  - Monitor appropriate labs  - Administer medications as ordered  - Follow MD's orders  - Update patient on plan of care   - Discharge planning   - See additional Care Plan goals for specific interventions  Outcome: Progressing  Goal: Patient/Family Short Term Goal  Description: Patient's Short Term Goal: discharge home    Interventions:   - Monitor vitals  - Monitor appropriate labs  - Administer medications as ordered  - Follow MD's orders  - Update patient on plan of care   - Discharge planning   - See additional Care Plan goals for specific interventions  Outcome: Progressing     Problem: CARDIOVASCULAR - ADULT  Goal: Maintains optimal cardiac output and hemodynamic stability  Description: INTERVENTIONS:  - Monitor vital signs, rhythm, and trends  - Monitor for bleeding, hypotension and signs of decreased cardiac output  - Evaluate effectiveness of vasoactive medications to optimize hemodynamic stability  - Monitor arterial and/or venous puncture sites for bleeding and/or hematoma  - Assess quality of pulses, skin color and temperature  - Assess for signs of decreased coronary artery perfusion - ex. Angina  - Evaluate fluid balance, assess for edema, trend  weights  Outcome: Progressing     Problem: METABOLIC/FLUID AND ELECTROLYTES - ADULT  Goal: Hemodynamic stability and optimal renal function maintained  Description: INTERVENTIONS:  - Monitor labs and assess for signs and symptoms of volume excess or deficit  - Monitor intake, output and patient weight  - Monitor urine specific gravity, serum osmolarity and serum sodium as indicated or ordered  - Monitor response to interventions for patient's volume status, including labs, urine output, blood pressure (other measures as available)  - Encourage oral intake as appropriate  - Instruct patient on fluid and nutrition restrictions as appropriate  Outcome: Progressing     Problem: HEMATOLOGIC - ADULT  Goal: Maintains hematologic stability  Description: INTERVENTIONS  - Assess for signs and symptoms of bleeding or hemorrhage  - Monitor labs and vital signs for trends  - Administer supportive blood products/factors, fluids and medications as ordered and appropriate  - Administer supportive blood products/factors as ordered and appropriate  Outcome: Progressing

## 2024-02-14 NOTE — PROGRESS NOTES
[facility]     Gastroenterology Progress Note    Jacob Patton Patient Status:  Inpatient    2000 MRN [id number]   Location [facility] 5SW/SE Attending No att. providers found   Hosp Day # 1 PCP Nj Randhawa MD       Subjective:   Kenzie overnight. Feels good this morning. Tolerating solids. No abdominal pain, n/v. No fever.     Objective:   Blood pressure 105/59, pulse 94, temperature 98.2 °F (36.8 °C), temperature source Oral, resp. rate 16, height 5' 7\" (1.702 m), weight 148 lb (67.1 kg), SpO2 99%. Body mass index is 23.18 kg/m².    General: awake, alert and oriented, no acute distress  HEENT: moist mucus membranes  PULM: no conversational dyspnea  CARDIOVASCULAR: regular rate and rhythm, the extremities are warm and well perfused  GI: soft, non-tender, non-distended, + BS, no rebound/guarding   EXTREMITIES: no edema, moving all extremities  SKIN: no visible rash  NEURO: appropriate and interactive    Assessment and Plan:   Jacob Patton is a a(n) 23 year old male otherwise healthy, who presents with symptomatic anemia and epigastric abdominal pain.  Labs on admission notable for hemoglobin 6.1 status post 1 unit PRBC with appropriate response to hemoglobin 7 1.  Iron studies consistent with profound iron deficiency anemia. CT with diffuse wall thickening of proximal duodenum. EGD showed large duodenal bulb ulcer with luminal narrowing, biopsied.    Recommend:  - continue ppi bid x 8 weeks then daily  - follow-up pathology  - s/p IV venofer during admission  - ok to discharge from my stance, follow-up gi clinic upon discharge, recommend repeat EGD in 8-12 weeks to assess healing    Mayuri Reyna MD  [facility]      Results:     Lab Results   Component Value Date    WBC 11.7 (H) 2024    HGB 7.2 (L) 2024    HCT 28.0 (L) 2024    .0 (H) 2024    CREATSERUM 0.89 2024    BUN 6 (L) 2024      02/14/2024    K 3.8 02/14/2024     02/14/2024    CO2 26.0 02/14/2024    GLU 83 02/14/2024    CA 9.3 02/14/2024    ALB 4.6 02/12/2024    ALKPHO 106 02/12/2024    BILT 0.3 02/12/2024    TP 8.1 02/12/2024    AST 11 02/12/2024    ALT 9 (L) 02/12/2024    TSH 1.322 02/12/2024    LIP 56 02/12/2024    MG 2.0 02/14/2024       CT ABDOMEN+PELVIS(CONTRAST ONLY)(CPT=74177)    Result Date: 2/12/2024  CONCLUSION:   Diffuse wall thickening with mucosal hyperenhancement of the first portion of the duodenum, compatible with duodenitis.  Recommend correlation with endoscopy for assessment of etiology.  No evidence of perforation.  6 millimeter tasia hepatis lymph node is not enlarged by imaging criteria.  This could be reactive in the setting of duodenitis.  Small pericardial effusion, nonspecific.      Dictated by (CST): Freda Douglas MD on 2/12/2024 at 8:37 PM     Finalized by (CST): Freda Douglas MD on 2/12/2024 at 8:47 PM

## 2024-02-14 NOTE — TELEPHONE ENCOUNTER
Dr. Reyna's discharge recommendations:    Recommend:  - continue ppi bid x 8 weeks then daily  - follow-up pathology  - s/p IV venofer during admission  - ok to discharge from my stance, follow-up gi clinic upon discharge, recommend repeat EGD in 8-12 weeks to assess healing

## 2024-02-15 ENCOUNTER — PATIENT OUTREACH (OUTPATIENT)
Dept: CASE MANAGEMENT | Age: 24
End: 2024-02-15

## 2024-02-15 DIAGNOSIS — D64.9 ANEMIA, UNSPECIFIED TYPE: Primary | ICD-10-CM

## 2024-02-15 DIAGNOSIS — Z02.9 ENCOUNTERS FOR ADMINISTRATIVE PURPOSE: ICD-10-CM

## 2024-02-15 LAB — TTG IGA SER-ACNC: 0.9 U/ML (ref ?–7)

## 2024-02-15 NOTE — TELEPHONE ENCOUNTER
Called pt x2, left message to call back    [employer] message also sent.     CSS:  Please transfer to RN if patient calls back.  Thank you.

## 2024-02-16 ENCOUNTER — TELEPHONE (OUTPATIENT)
Dept: INTERNAL MEDICINE CLINIC | Facility: CLINIC | Age: 24
End: 2024-02-16

## 2024-02-16 NOTE — PAYOR COMM NOTE
--------------  ADMISSION REVIEW     Payor: [employer]  Subscriber #:  [id number]  Authorization Number: [id number]    Admit date: 24  Admit time: 12:33 PM       REVIEW DOCUMENTATION:    ED Provider Notes signed by Rozina Herrmann DO at 2024  2:09 AM      [facility] Note  Patient: Jacob Patton Age: 23 year old Sex: male      MRN: [id number]  : 2000    Patient Seen in: [facility] Emergency Department    History     Chief Complaint   Patient presents with    Abnormal Labs     Stated Complaint: abnormal result    History obtained from: patient     This is a 23-year-old, no past medical history other than ADHD, presenting to the ER due to abnormal labs.  On chart review he was seen by his primary Dr. Randhawa earlier today and has been complaining of abdominal pain for the past 3 to 4 months and noticed dark stool couple of months ago as well as some shortness of breath on exertion, lightheadedness and fatigue.  Patient states that he has been having intermittent upper abdominal pain and not particular associated with certain foods ongoing for the last few months, and has intermittently noticed dark sticky stools though states he has not had anything like this nor any bloody stool for the past few days.  He has noticed he is more short of breath when he exerts himself but this resolves at rest, also has felt fatigued and lightheaded despite taking his prescribed ADHD medicine.  He denies any fevers or chills.  No hematemesis but does state that he vomited a few days ago and had what looked like coffee-ground emesis.  Denies any significant alcohol use. No personal or family hx of bleeding disorders.       Review of Systems:  Review of Systems  Positive for stated complaint: abnormal result. Constitutional and vital signs reviewed. All other systems reviewed and negative except as noted above.    Patient History:  Past Medical History:   Diagnosis Date     ADD (attention deficit disorder)      Physical Exam     ED Triage Vitals   BP 02/12/24 1749 119/73   Pulse 02/12/24 1749 102   Resp 02/12/24 1749 19   Temp 02/12/24 1749 98.4 °F (36.9 °C)   Temp src 02/12/24 2122 Temporal   SpO2 02/12/24 1749 100 %   O2 Device 02/12/24 2314 None (Room air)       Current:/76 (BP Location: Right arm)   Pulse 76   Temp 98.4 °F (36.9 °C) (Oral)   Resp 18   Ht 170.2 cm (5' 7\")   Wt 67.1 kg   SpO2 100%   BMI 23.18 kg/m²     Physical Exam  Vitals and nursing note reviewed.   Constitutional:       General: He is not in acute distress.     Appearance: He is not toxic-appearing.   HENT:      Head: Normocephalic and atraumatic.      Right Ear: External ear normal.      Left Ear: External ear normal.      Nose: Nose normal.      Mouth/Throat:      Mouth: Mucous membranes are moist.   Eyes:      Comments: Pale conjunctivae    Cardiovascular:      Rate and Rhythm: Normal rate and regular rhythm.      Heart sounds: No murmur heard.  Pulmonary:      Effort: Pulmonary effort is normal. No respiratory distress.      Breath sounds: No wheezing or rales.   Abdominal:      General: There is no distension.      Palpations: Abdomen is soft.      Tenderness: There is abdominal tenderness. There is no right CVA tenderness, left CVA tenderness, guarding or rebound.      Comments: LLQ ttp   Genitourinary:     Rectum: Guaiac result negative.      Comments: No stool on exam   Musculoskeletal:         General: No deformity.   Skin:     General: Skin is warm and dry.      Capillary Refill: Capillary refill takes less than 2 seconds.      Coloration: Skin is pale.   Neurological:      General: No focal deficit present.      Mental Status: He is alert.   ED Course   Labs:   Labs Reviewed   BASIC METABOLIC PANEL (8) - Abnormal; Notable for the following components:       Result Value    Glucose 115 (*)     BUN/CREA Ratio 9.6 (*)     All other components within normal limits   RBC MORPHOLOGY SCAN -  Abnormal; Notable for the following components:    RBC Morphology See morphology below (*)     Microcytosis 3+ (*)     Hypochromia 3+ (*)     All other components within normal limits   IRON AND TIBC - Abnormal; Notable for the following components:    Iron 13 (*)     Total Iron Binding Capacity 507 (*)     % Saturation 3 (*)     All other components within normal limits   HEMOGLOBIN - Abnormal; Notable for the following components:    HGB 7.1 (*)     All other components within normal limits   CBC W/ DIFFERENTIAL - Abnormal; Notable for the following components:    RBC 4.29 (*)     HGB 6.2 (*)     HCT 25.7 (*)     MCV 59.9 (*)     MCH 14.5 (*)     MCHC 24.1 (*)     RDW 20.3 (*)     .0 (*)     All other components within normal limits   LIPASE - Normal   CBC WITH DIFFERENTIAL WITH PLATELET    Narrative:     The following orders were created for panel order CBC With Differential With Platelet.  Procedure                               Abnormality         Status                     ---------                               -----------         ------                     CBC W/ DIFFERENTIAL[[id number]]          Abnormal            Final result                 Please view results for these tests on the individual orders.   BASIC METABOLIC PANEL (8)   CBC WITH DIFFERENTIAL WITH PLATELET   TYPE AND SCREEN    Narrative:     The following orders were created for panel order Type and screen.  Procedure                               Abnormality         Status                     ---------                               -----------         ------                     ABORH (Blood Type)[[id number]]                               Final result               Antibody Screen[[id number]]                                  Final result                 Please view results for these tests on the individual orders.   ABORH (BLOOD TYPE)   ANTIBODY SCREEN   ABORH CONFIRMATION   PREPARE RBC     Radiology findings:  I personally reviewed the  images.   CT ABDOMEN+PELVIS(CONTRAST ONLY)(CPT=74177)    Result Date: 2/12/2024  CONCLUSION:   Diffuse wall thickening with mucosal hyperenhancement of the first portion of the duodenum, compatible with duodenitis.  Recommend correlation with endoscopy for assessment of etiology.  No evidence of perforation.  6 millimeter tasia hepatis lymph node is not enlarged by imaging criteria.  This could be reactive in the setting of duodenitis.  Small pericardial effusion, nonspecific.      Dictated by (CST): Freda Douglas MD on 2/12/2024 at 8:37 PM     Finalized by (CST): Freda Douglas MD on 2/12/2024 at 8:47 PM           Cardiac Monitor: Interpreted by me.   Pulse Readings from Last 1 Encounters:   02/12/24 76       MDM   This is a 23-year-old, no past medical history other than ADHD, presenting to the ER due to abnormal labs.  On chart review he was seen by his primary Dr. Randhawa earlier today and has been complaining of abdominal pain for the past 3 to 4 months and noticed dark stool couple of months ago as well as some shortness of breath on exertion, lightheadedness and fatigue.    Patient borderline tachycardic here, slightly pale appearing but in no distress stress, has mild left lower quadrant tenderness but no epigastric or right upper quadrant tenderness on exam, no rebound or guarding.  Guaiac negative though there was no stool on rectal exam.    This patient presents with abdominal pain. Exam is as above.     Differential diagnosis includes:    Upper GI pathology including gastritis, PUD, cholecystitis, choledocholithiasis, pancreatitis, biliary colic, SBO     Lower suspicion for lower GI pathology including diverticulitis, appendicitis, intraabdominal abscess, volvulus, diverticular bleed given no BRBPR low suspicion    Anemia, thrombocytopenia    Plan: will obtain cbc, cmp, T&S, lipase, CTAP for further disposition. Will give IV protonix. Will likely consult GI given symptomatic anemia and abdominal  pain for observation for EGD       ED Course as of 02/13/24 0208  ------------------------------------------------------------  Time: 02/12 1827  Comment: Call from lab, pt with Hb of 6.2, otherwise wbc wnl and slight thrombcytosis of 570K  ------------------------------------------------------------  Time: 02/12 1843  Value: Glucose(!): 115  Comment: (Reviewed)  ------------------------------------------------------------  Time: 02/12 1848  Comment: Paged GI Dr. Ellis recommends npo, Iv protonix, NPO midnight for EGD/poss scope tomorrow   ------------------------------------------------------------  Time: 02/12 1904  Comment: Case d/w Dr. Marquez accepts admission pending CT   ------------------------------------------------------------  Time: 02/12 2055  Value: CT ABDOMEN+PELVIS(CONTRAST ONLY)(CPT=74177)  Comment: CONCLUSION:      Diffuse wall thickening with mucosal hyperenhancement of the first portion of the duodenum, compatible with duodenitis.  Recommend correlation with endoscopy for assessment of etiology.  No evidence of perforation.      6 millimeter tasia hepatis lymph node is not enlarged by imaging criteria.  This could be reactive in the setting of duodenitis.      Small pericardial effusion, nonspecific.         ------------------------------------------------------------  Time: 02/12 2134  Comment: Pt updated with imaging results.       Procedures:  Procedures    Critical Care:  Due to a high probability of clinically significant, life threatening deterioration, the patient required my highest level of preparedness to intervene emergently and I personally spent 30 minutes of critical care time directly and personally managing the patient. This critical care time included obtaining a history; examining the patient; pulse oximetry; ordering and review of studies; arranging urgent treatment with development of a management plan; evaluation of patient's response to treatment; frequent reassessment;  and, discussions with other providers.    This critical care time was performed to assess and manage the high probability of imminent, life-threatening deterioration that could result in multi-organ failure. It was exclusive of separately billable procedures and treating other patients and teaching time.      Disposition and Plan     Clinical Impression:  1. Anemia, unspecified type    2. Duodenitis    3. Pericardial effusion        Disposition:  Admit  Hospital Problems       Present on Admission  Date Reviewed: 2/12/2024            ICD-10-CM Noted POA    * (Principal) Anemia, unspecified type D64.9 2/12/2024 Unknown    Duodenitis K29.80 2/12/2024 Unknown    Pericardial effusion I31.39 2/12/2024 Unknown                2/12 HISTORY AND PHYSICAL EXAMINATION     CHIEF COMPLAINT:  Profound iron deficiency anemia.     HISTORY OF PRESENT ILLNESS:  Patient is a 23-year-old  male, presented to the emergency department for evaluation of progressive fatigue for the last several weeks.  CBC showed hemoglobin of 6.2, MCV 59.9, platelet count 570.  Iron level still pending.  Chemistry was unremarkable.  CT scan of the abdomen still pending.  Blood transfusion was ordered, and patient will be admitted to the hospital for further observation.     REVIEW OF SYSTEMS:  Patient said he has been feeling decreased energy and fatigue for the last several weeks to months.  Also, up until a few weeks ago, he has been seeing on-and-off black bowel movements.  He has chronic epigastric discomfort and dyspepsia.  He denies any nausea or vomiting.  No significant weight loss.  Other 12-point review of systems is negative.        PHYSICAL EXAMINATION:    GENERAL:  Alert and oriented to time, place and person.  Pale in color.  No acute distress.   VITAL SIGNS:  Temperature 98.4, pulse 102, respiratory rate 19, blood pressure 119/73, pulse ox 100% on room air.  HEENT:  Atraumatic.  Oropharynx clear.  Moist mucous membranes.  Ears  and nose normal.  Eyes:  Anicteric sclerae.    NECK:  Supple.  No lymphadenopathy.  Trachea midline.  Full range of motion.   LUNGS:  Clear to auscultation bilaterally.  Normal respiratory effort.    HEART:  Regular rate and rhythm.  S1 and S2 auscultated.  No murmur.    ABDOMEN:  Soft, nondistended.  No tenderness.  Positive bowel sounds.   EXTREMITIES:  No peripheral edema, clubbing or cyanosis.   NEUROLOGIC:  Motor and sensory intact.       ASSESSMENT:  Profound microcytic anemia suggestive of iron deficiency.  History also suggestive of possible peptic ulcer disease and intermittent blood loss.     PLAN:  Patient will be admitted to general medical floor.  Obtain iron level.  IV Venofer.  Blood transfusion.  Gastroenterology consult.  IV Protonix.  Clear liquid diet.          2/13 GI Consult  Reason for Consultation:  BRIDGET     History of Present Illness:  Jacob Patton is a a(n) 23 year old male otherwise healthy, who presents with symptomatic anemia and epigastric abdominal pain.  He states over the past 5 to 6 months he is having epigastric pain and dyspepsia.  Some nausea but no emesis.  Food does not appear to worsen abdominal pain.  No unintentional weight loss.  No melena or hematochezia.  No prior EGD or colonoscopy.  Denies significant NSAID use.  No heartburn or dysphagia. No diarrhea or constipation.     Jacob Patton is a a(n) 23 year old male otherwise healthy, who presents with symptomatic anemia and epigastric abdominal pain.  Labs on admission notable for hemoglobin 6.1 status post 1 unit PRBC with appropriate response to hemoglobin 7 1.  Iron studies consistent with profound iron deficiency anemia. CT with diffuse wall thickening of proximal duodenum. Possible peptic ulcer disease vs inflammatory process such as celiac sprue vs less likely neoplasm. No significant nsaid use.      Recommend:  - PPI 40mg IV BID  - plan EGD   - IV venofer  - s/p 1 unit prbc   - check  TTG          Date of procedure: 02/13/24     Procedure: EGD w/biopsy     Pre-operative diagnosis: epigastric pain, anemia, abnormal CT     Post-operative diagnosis: see impression     Medications: MAC     Complications: none    Complications: None     Findings:       1. Esophagus: Z line unremarkable. Mild LA grade B erosive esophagitis at GE junction otherwise remainder of esophagus appeared normal.     2. Stomach: We then entered the stomach. Gastric mucosa appeared normal in the forward view with no evidence of erythema, erosions, or ulcerations. There was no evidence of any luminal or submucosal masses. A retroflexed view allowed examination of the angularis, cardia and fundus and these areas also appeared normal with a non-patulous cardia. No hiatal hernia seen.  Random biopsies of the stomach (body, antrum, cardia, and incisura) were taken with cold forceps for histology.      3. Duodenum: A nearly circumferential ulcer causing luminal narrowing and edema in the duodenal bulb along the sweep. The scope was careful navigated through this short segment narrowing, inner diameter about 12 mm, biopsied with cold forceps for histology. The second portion duodenum distal to ulcer was normal appearing with bile.      We then withdrew the instrument from the patient who tolerated the procedure well.      Impression:   1. Large duodenal bulb ulcer with luminal narrowing, biopsied  2. LA grade B erosive esophagitis     Recommend:  1. Follow-up pathology  2. Continue ppi bid  3. IV iron          2/13  S: Patient found to have anemia  S/p prbc and venofer  Has had melena in past, but hadn't noted it recently  Hx. Of binge drinking      BMP         CBC       Other      Na 137 Cl 107 BUN 6 Glu 80     Hb 7.1     PTT - Procal -    K 3.8 CO2 23.0 Cr 0.89     WBC 13.3 >< .0   INR - CRP -    Renal Lytes Endo       Hct 27.5     Trop - D dim -    eGFR - Ca 9.4 POC Gluc  -       LFT     pBNP - Lactic -    eGFR AA - PO4 - A1c  5.1     AST 11 APk 106 Prot 8.1   LDL -        Mg - TSH 1.322     ALT 9 T woodrow 0.3 Alb 4.6            Medications:    iron sucrose  300 mg Intravenous Daily    pantoprazole  40 mg Intravenous Q12H               Assessment & Plan:   ASSESSMENT / PLAN:      Acute Iron deficiency anemia  -hb 6.2 on admisssion  -s/p one unit prbc on 2/12  -CT abdomen reviewed-->diffuse wall thickening with mucosal hyper enhancement of 1st portion of duodenum  -s/p EGD with large duodenal bulb ulcer, with luminal narrowing, LA grade b erosive esophagitis  -follow-up pathology  -ppi bid  -IV iron  -start diet     Binge drinking  -counseled on cessation        2/14  Medications:    iron sucrose  300 mg Intravenous Daily    pantoprazole  40 mg Intravenous Q12H               Assessment & Plan:   ASSESSMENT / PLAN:   Patient is a 23-year-old  male with hx of alcohol abuse, who p/w progressive fatigue for several weeks.      # Acute  blood loss anemia due to upper GI bleed  # Iron deficiency anemia  - CT abdomen reviewed-->diffuse wall thickening with mucosal hyper enhancement of 1st portion of duodenum  - hgb 6.2 on admission, 1U PRBC on 2/12, hgb 7.2 on 2/14  - s/p EGD with large duodenal bulb ulcer, with luminal narrowing, LA grade b erosive esophagitis  - follow-up pathology  - Protonix bid  - About 1.8 g iron deficit: IV iron, received 800 mg elemental iron this admission.  - tolerated low fiber diet, stop IVF  - GI following     # Alcohol abuse with binge drinking  - counseled on cessation                Medications 02/08/24 02/09/24 02/10/24 02/11/24 02/12/24 02/13/24 02/14/24   iron sucrose (Venofer) 20 mg/mL injection 200 mg  Dose: 200 mg  Freq: Once Route: IV  Start: 02/12/24 2315 End: 02/12/24 2335   Admin Instructions:   doses less than or equal to 200mg may be given slow IV Push over 2 - 5 minutes.        2333 -New Bag          iron sucrose (Venofer) 300 mg in sodium chloride 0.9% 250 mL IVPB  Dose: 300 mg  Freq:  Daily Route: IV  Last Dose: 300 mg (02/14/24 0955)  Start: 02/13/24 0930 End: 02/14/24 1125   Admin Instructions:   Infuse 300 mg over 90 min or 400 mg over 150 min         1054 GM-New Bag      0955 GM-New Bag        pantoprazole (Protonix) 40 mg in sodium chloride 0.9% PF 10 mL IV push  Dose: 40 mg  Freq: Every 12 hours Route: IV  Start: 02/13/24 2154 End: 02/14/24 0950   Admin Instructions:   Dilute with 10 ml NS; IV push over 2 minutes         2148 JT-Given      0950-D/C'd      pantoprazole (Protonix) 40 mg in sodium chloride 0.9% PF 10 mL IV push  Dose: 40 mg  Freq: Daily Route: IV  Start: 02/12/24 2315 End: 02/13/24 1330   Admin Instructions:   Dilute with 10 ml NS; IV push over 2 minutes        (2315 JH)-Not Given [C]      0954 GM-Given     1330-D/C'd       pantoprazole (Protonix) 40 mg in sodium chloride 0.9% PF 10 mL IV push  Dose: 40 mg  Freq: Once Route: IV  Start: 02/12/24 1830 End: 02/12/24 1930   Admin Instructions:   Dilute with 10 ml NS; IV push over 2 minutes        1928 CM-Given              Vitals (last day) before discharge       Date/Time Temp Pulse Resp BP SpO2 Weight O2 Device O2 Flow Rate (L/min) [facility]    02/14/24 0921 98.2 °F (36.8 °C) 94 16 105/59 -- -- None (Room air) --     02/14/24 0432 98 °F (36.7 °C) -- 16 105/66 99 % -- None (Room air) --     02/13/24 2142 99.2 °F (37.3 °C) 83 16 116/70 100 % -- None (Room air) --     02/13/24 1651 98.6 °F (37 °C) 82 16 107/59 100 % -- None (Room air) --     02/13/24 1325 -- 94 15 114/73 97 % -- -- --     02/13/24 1320 -- 103 10 133/107 -- -- -- -- SR    02/13/24 1317 -- 113 23 149/69 98 % -- None (Room air) -- SR    02/13/24 1200 98.7 °F (37.1 °C) 83 -- 99/51 -- -- None (Room air) -- CA    02/13/24 0952 98.6 °F (37 °C) 77 18 112/61 99 % -- None (Room air) -- GM    02/13/24 0536 98.6 °F (37 °C) 75 18 123/68 99 % -- None (Room air) --      Blood Transfusion Record       Product Unit Status Volume Start End            Transfuse RBC        24  [id number]  [id number] Completed 24 0011  24 2306                --------------  DISCHARGE REVIEW    Payor: [employer]  Subscriber #:  [id number]  Authorization Number: [id number]    Admit date: 24  Admit time:  12:33 PM  Discharge Date: 2024  1:41 PM     Admitting Physician: Cassie Marquez MD  Attending Physician:  No att. providers found  Primary Care Physician: Nj Randhawa MD          Discharge Summary Notes        Discharge Summary signed by Hi Mak MD at 2024  3:23 PM       Author: Hi Mak MD Specialty: HOSPITALIST Author Type: Physician    Filed: 2024  3:23 PM Date of Service: 2024  3:22 PM Status: Signed    : Hi Mak MD (Physician)         [facility]    Discharge Summary    Jacob Patton Patient Status:  Inpatient    2000 MRN [id number]   Location [facility] 5SW/SE Attending Hi Mak MD   Hosp Day # 0 PCP Nj Randhawa MD     Date of Admission: 2024     Date of Discharge: 24     Hospital Discharge Diagnoses:  # Acute  blood loss anemia due to upper GI bleed  # Iron deficiency anemia    Lace+ Score: 25  59-90 High Risk  29-58 Medium Risk  0-28   Low Risk.    TCM Follow-Up Recommendation:  LACE < 29: Low Risk of readmission after discharge from the hospital. No TCM follow-up needed.    HPI Per Admitting Physician: Patient is a 23-year-old  male, presented to the emergency department for evaluation of progressive fatigue for the last several weeks. CBC showed hemoglobin of 6.2, MCV 59.9, platelet count 570. Iron level still pending. Chemistry was unremarkable. CT scan of the abdomen still pending. Blood transfusion was ordered, and patient will be admitted to the hospital for further observation.     Hospital Course: Patient is a 23-year-old  male with hx of alcohol abuse, who p/w progressive fatigue for several weeks.      #  Acute  blood loss anemia due to upper GI bleed  # Iron deficiency anemia  - CT abdomen reviewed-->diffuse wall thickening with mucosal hyper enhancement of 1st portion of duodenum  - hgb 6.2 on admission, 1U PRBC on 2/12, hgb 7.2 on 2/14  - s/p EGD with large duodenal bulb ulcer, with luminal narrowing, LA grade b erosive esophagitis  - follow-up pathology  - About 1.8 g iron deficit: IV iron, received 800 mg elemental iron this admission. Ferrous sulphate daily at discharge.  - GI rec PPI BID for 8 weeks, outpatient follow up.     # Alcohol abuse with binge drinking  - counseled on cessation    Dispo: home      Physical Examination:  Vital Signs:  Blood pressure 105/59, pulse 94, temperature 98.2 °F (36.8 °C), temperature source Oral, resp. rate 16, height 5' 7\" (1.702 m), weight 148 lb (67.1 kg), SpO2 99%.     GENERAL:  The patient appeared to be in no distress.    SKIN:  Warm and hydrated  HEENT:  Head was atraumatic and normocephalic.    NECK:  Supple.  There was no JVD.   HEART:  S1 and S2 heard.  RRR   LUNGS:  Air entry was good.  No crackles or wheezes   ABDOMEN: Soft and non-tender.  Bowel sounds were present.  MUSCULOSKELETAL:  There was no deformity.    EXTREMITIES: There was no edema, clubbing or cyanosis  NEUROLOGICAL:  There was no focal deficit.  Cranial nerves II through XII were intact.  PSYCHIATRIC: Calm and cooperative     CULTURE:   No results found for this visit on 02/12/24.    IMAGING STUDIES:   CT ABDOMEN+PELVIS(CONTRAST ONLY)(CPT=74177)    Result Date: 2/12/2024  CONCLUSION:   Diffuse wall thickening with mucosal hyperenhancement of the first portion of the duodenum, compatible with duodenitis.  Recommend correlation with endoscopy for assessment of etiology.  No evidence of perforation.  6 millimeter tasia hepatis lymph node is not enlarged by imaging criteria.  This could be reactive in the setting of duodenitis.  Small pericardial effusion, nonspecific.      Dictated by (CST): Misael  MD Freda on 2/12/2024 at 8:37 PM     Finalized by (CST): Freda Douglas MD on 2/12/2024 at 8:47 PM           LABS :     Lab Results   Component Value Date    WBC 11.7 (H) 02/14/2024    HGB 7.2 (L) 02/14/2024    HCT 28.0 (L) 02/14/2024    .0 (H) 02/14/2024    CREATSERUM 0.89 02/14/2024    BUN 6 (L) 02/14/2024     02/14/2024    K 3.8 02/14/2024     02/14/2024    CO2 26.0 02/14/2024    GLU 83 02/14/2024    CA 9.3 02/14/2024    ALB 4.6 02/12/2024    ALKPHO 106 02/12/2024    BILT 0.3 02/12/2024    TP 8.1 02/12/2024    AST 11 02/12/2024    ALT 9 (L) 02/12/2024    TSH 1.322 02/12/2024    LIP 56 02/12/2024    MG 2.0 02/14/2024       Recent Labs   Lab 02/12/24  1803 02/12/24  2332 02/13/24  0535 02/14/24  0619   RBC 4.29*  --  4.47 4.50   HGB 6.2* 7.1* 7.1* 7.2*   HCT 25.7*  --  27.5* 28.0*   MCV 59.9*  --  61.5* 62.2*   MCH 14.5*  --  15.9* 16.0*   MCHC 24.1*  --  25.8* 25.7*   RDW 20.3*  --  22.6* 22.7*   NEPRELIM 6.57  --  9.71* 8.15*   WBC 10.3  --  13.3* 11.7*   .0*  --  486.0* 490.0*     Recent Labs   Lab 02/12/24  1554 02/12/24  1803 02/13/24  0535 02/14/24  0619   GLU 84 115* 80 83   BUN 9 9 6* 6*   CREATSERUM 0.93 0.94 0.89 0.89   CA 9.4 9.5 9.4 9.3   ALB 4.6  --   --   --     138 137 139   K 4.1 3.9 3.8 3.8    106 107 107   CO2 25.0 24.0 23.0 26.0   ALKPHO 106  --   --   --    AST 11  --   --   --    ALT 9*  --   --   --    BILT 0.3  --   --   --    TP 8.1  --   --   --      No results found for: \"PT\", \"INR\"    Discharge Medications:      Discharge Medications        START taking these medications        Instructions Prescription details   ferrous sulfate 325 (65 FE) MG Tbec      Take 1 tablet (325 mg total) by mouth daily with breakfast.   Stop taking on: April 14, 2024  Quantity: 60 tablet  Refills: 0     pantoprazole 40 MG Tbec  Commonly known as: Protonix      Take 1 tablet (40 mg total) by mouth 2 (two) times daily before meals.   Stop taking on: April 14,  2024  Quantity: 120 tablet  Refills: 0            CONTINUE taking these medications        Instructions Prescription details   amphetamine-dextroamphetamine 20 MG Tabs  Commonly known as: Adderall      Take 1 tablet by mouth 2 (two) times daily.   Refills: 0            STOP taking these medications      Omeprazole 40 MG Cpdr                  Where to Get Your Medications        These medications were sent to [id number] - [place], [place] - [address] AT [facility], [phone], [phone] [address], [facility] [address]      Phone: [phone]   ferrous sulfate 325 (65 FE) MG Tbec  pantoprazole 40 MG Tbec         Follow up Visits  Mayuri Reyna MD  [address]  [facility] [id number] [phone]    Follow up      Nj Randhawa MD  [address]  [address]  [facility] [id number] [phone]    Follow up in 1 week(s)        Consultants         Provider   Role Specialty     Vee Ellis MD  Consulting Physician GASTROENTEROLOGY            ----------------------------------------------------  35 MIN SPENT ON THIS DISCHARGE   2/14/2024  1:31 PM    Hi Mak MD, PhD  [facility] Hospitalist    Electronically signed by Hi Mak MD on 2/14/2024  3:23 PM         REVIEWER COMMENTS

## 2024-02-16 NOTE — TELEPHONE ENCOUNTER
S/w patient for TCM. He states that his left forearm and elbow have gotten more swollen since discharge. He states that it started at at the IV site. He states that it started in the hospital due to not having the IV in the right spot. He now rates that pain a 10/10 when trying to move his arm and a 3-4/10 at rest. He states that he can feel heat radiating from the swollen areas but denies that there is any redness. He also denies any discharge. He states that he did have a temperature of 100 yesterday but today his temp is normal. He has been icing. NCM discussed elevation and warm compresses and when to return to ER. He was discharged after having a GI bleed and is only taking tylenol for pain. He v/u. He is asking for further advice from PCP.     He also states that he is a  and if he should return to work tomorrow? He may need a note if he is not able to return.     Please advise.

## 2024-02-16 NOTE — TELEPHONE ENCOUNTER
Spoke with patient. He will maintain his appointment with PCP for Tuesday. Pt advised to have his arm evaluated at [facility]. To have his arm evaluated  since the pain and swelling has gotten worse. He states that he will go to [facility].

## 2024-02-16 NOTE — PROGRESS NOTES
Pt called and LM stating that his arm is still swollen from the IV and it hurts when moving arm. NCM called pt back. See below.     Initial Post Discharge Follow Up   Discharge Date: 2/14/24  Contact Date: 2/15/2024    Consent Verification:  Assessment Completed With: Patient  HIPAA Verified?  Yes    Discharge Dx:   Anemia, unspecified type     General:   How have you been since your discharge from the hospital?  My left arm is still swollen where the IV was, I know it wasn't in my vein properly and it's still swollen, it's worse since leaving. It hurts to move it and it gives me pain.   Do you have any pain since discharge?  Yes, left arm, 10/10 when moving, 3-4/10 when not moving.   Is the pain manageable at home? Yes  When you were leaving the hospital were your discharge instructions reviewed with you? Yes  How well were your discharge instructions explained to you?   On a scale of 1-5   1- Very Poor and 5- Very well   Very Well  Do you have any questions about your discharge instructions?  No  Before leaving the hospital was your diagnoses explained to you? Yes  Do you have any questions about your diagnoses? No  Are you able to perform normal daily activities of living as you have prior to your hospital stay (dressing, bathing, ambulating to the bathroom, etc)? yes  (NCM) Was patient given a different diet per AVS? no      Medications:   Current Outpatient Medications   Medication Sig Dispense Refill    pantoprazole 40 MG Oral Tab EC Take 1 tablet (40 mg total) by mouth 2 (two) times daily before meals. 120 tablet 0    ferrous sulfate 325 (65 FE) MG Oral Tab EC Take 1 tablet (325 mg total) by mouth daily with breakfast. 60 tablet 0    amphetamine-dextroamphetamine 20 MG Oral Tab Take 1 tablet by mouth 2 (two) times daily.       Were there any changes to your current medication(s) noted on the AVS? Yes  If so, were these medication changes discussed with you prior to leaving the hospital? Yes  If a new  medication was prescribed:    Was the new medication's purpose & side effects reviewed? Yes  Do you have any questions about your new medication? No  Did you  your discharge medications when you left the hospital?  Pt states that he got the iron but the pantoprazole was too expensive and therefor he bought an alternative per the pharmacist recommendation but he could not recall the name.   Let's go over your medications together to make sure we are not missing anything. Medications Reviewed  Are there any reasons that keep you from taking your medication as prescribed? No  Are you having any concerns with constipation? No      Discharge medications reviewed/discussed/and reconciled against outpatient medications with patient.  Any changes or updates to medications sent to PCP.  Patient Acknowledged     Referrals/orders at D/C:  Referrals/orders placed at D/C? no    DME ordered at D/C? No      Discharge orders, AVS reviewed and discussed with patient. Any changes or updates to orders sent to PCP.  Patient Acknowledged        Follow up appointments:      Your appointments       Date & Time Appointment Department ([place])    Mar 12, 2024  3:00 PM CDT [facility]    Contact your primary care provider if your insurance requires a referral.    Please arrive 15 minutes prior to your scheduled appointment. Be sure to bring your current Insurance card, Photo ID, and medication bottles or a list of your current medications.      A 24 hour notice is required to cancel any appointment or you may be charged a $40 No Show Fee.     Important: 24 hour notice is required to cancel any appointment or you may be charged a $40 No Show Fee. Please notify your physician office.               [facility] [place]  [facility]    [address]  [address]  [facility] [address]  [phone]            TCC  Was TCC ordered: No    PCP (If no TCC appointment)  Does patient already have a PCP appointment scheduled? No  NCM Scheduled PCP office TCM appointment with patient      Is there any reason as to why you cannot make your appointment(s)?  No     Needs post D/C:   Now that you are home, are there any needs or concerns you need addressed before your next visit with your PCP?  (DME, meds, questions, etc.):  see notes regarding left arm    Interventions by NCM:   NCM reviewed discharge instructions and when to seek medical attention with the patient. He states that with regards to the stomach issues, he is doing fine. He denied having any blood in his stool. He denied having any n/v/c/d, sob, dizziness, HA. He does state that his left forearm and elbow have gotten more swollen since discharge. He states that it started at at the IV site. He states that it started in the hospital due to not having the IV in the right spot. He now rates that pain a 10/10 when trying to move his arm and a 3-4/10 at rest. He states that he can feel heat radiating from the swollen areas but denies that there is any redness. He also denies any discharge. He states that he did have a temperature of 100 yesterday but today his temp is normal. He has been icing. NCM discussed elevation and warm compresses and when to return to ER. He was discharged after having a GI bleed and is only taking tylenol for pain. He v/u. Med review completed. ERICH sent TE to PCP office and pt aware that he will receive a return call. He is also questioning on if she can return to work tomorrow as a . NCM included this in the TE.       CCM referral placed:    No    BOOK BY DATE: 2/28/24

## 2024-02-20 ENCOUNTER — OFFICE VISIT (OUTPATIENT)
Dept: INTERNAL MEDICINE CLINIC | Facility: CLINIC | Age: 24
End: 2024-02-20
Payer: COMMERCIAL

## 2024-02-20 VITALS
BODY MASS INDEX: 24 KG/M2 | SYSTOLIC BLOOD PRESSURE: 120 MMHG | OXYGEN SATURATION: 100 % | DIASTOLIC BLOOD PRESSURE: 60 MMHG | HEART RATE: 77 BPM | TEMPERATURE: 99 F | WEIGHT: 156 LBS

## 2024-02-20 DIAGNOSIS — E61.1 IRON DEFICIENCY: ICD-10-CM

## 2024-02-20 DIAGNOSIS — K29.71 GASTRITIS WITH HEMORRHAGE, UNSPECIFIED CHRONICITY, UNSPECIFIED GASTRITIS TYPE: ICD-10-CM

## 2024-02-20 DIAGNOSIS — Z09 HOSPITAL DISCHARGE FOLLOW-UP: Primary | ICD-10-CM

## 2024-02-20 DIAGNOSIS — I80.9 THROMBOPHLEBITIS: ICD-10-CM

## 2024-02-20 DIAGNOSIS — K26.9 DUODENAL ULCER: ICD-10-CM

## 2024-02-20 PROCEDURE — 3074F SYST BP LT 130 MM HG: CPT | Performed by: INTERNAL MEDICINE

## 2024-02-20 PROCEDURE — 3078F DIAST BP <80 MM HG: CPT | Performed by: INTERNAL MEDICINE

## 2024-02-20 PROCEDURE — 99496 TRANSJ CARE MGMT HIGH F2F 7D: CPT | Performed by: INTERNAL MEDICINE

## 2024-02-20 RX ORDER — IBUPROFEN 600 MG/1
600 TABLET ORAL
COMMUNITY
Start: 2024-02-17 | End: 2024-02-20

## 2024-02-20 RX ORDER — CEFADROXIL 500 MG/1
500 CAPSULE ORAL 2 TIMES DAILY
COMMUNITY
Start: 2024-02-17 | End: 2024-02-24

## 2024-02-20 NOTE — TELEPHONE ENCOUNTER
Called patient, name/ verified.    Discussed with patient dc clinic appointment per Dr. Reyna below.    Offered available appointment.    Patient confirmed and accepted appointment.    Date, time, office location and contact number verified.     Instructed patient to arrive 15 minutes before appt time and bring ID/insurance card.    Patient verbalized understanding.    2024  2:00 PM  Consult with PHYLLIS Yao  [facility] [address] [address]  [phone]

## 2024-02-21 NOTE — H&P
[facility]  Return Patient      HPI:     Chief Complaint   Patient presents with    Hospital F/U       Jacob Basilio Patton is a 23 year old male presenting for:  Hospital follow up.  Has  has a past medical history of ADD (attention deficit disorder).    Was admitted recently after initially seeing me in clinic and labs revealing Hgb of 6.1.  Iron deficiency anemia. CT revealed diffuse wall thickening with mucosal hyperenhancement.  GI performed EGD which revealed duodenal bulb ulcer with luminal narrowing.  Biopsy benign.  On PPI BID.      After hospitalization was seen at [facility] for left arm swelling.  Was diagnosed with superficial thrombophlebitis of left upper arm and cellulitis.  Treated with antibiotics.  Symptoms overall improving.  Likely attributed to IV during hospitalization.      He overall is feeling better.        Labs:   Complete Metabolic Panel:  Lab Results   Component Value Date/Time     02/14/2024 06:19 AM    K 3.8 02/14/2024 06:19 AM     02/14/2024 06:19 AM    CO2 26.0 02/14/2024 06:19 AM    CREATSERUM 0.89 02/14/2024 06:19 AM    CA 9.3 02/14/2024 06:19 AM    GLU 83 02/14/2024 06:19 AM    TP 8.1 02/12/2024 03:54 PM    ALB 4.6 02/12/2024 03:54 PM    ALKPHO 106 02/12/2024 03:54 PM    AST 11 02/12/2024 03:54 PM    ALT 9 (L) 02/12/2024 03:54 PM    BILT 0.3 02/12/2024 03:54 PM    TSH 1.322 02/12/2024 03:54 PM        CBC:  @LABRCNTIP(RBC:3,HGB:3,HCT:3,MCV:3,MCH:3,MCHC:3,RDW:3,NEPRELIM:3,WBC:3,PLT:3)@       Hemoglobin A1C, Microalbumin  Lab Results   Component Value Date/Time    A1C 5.1 02/12/2024 03:54 PM        Lipid panel  Lab Results   Component Value Date/Time    CHOLEST 96 02/12/2024 03:54 PM    HDL 35 (L) 02/12/2024 03:54 PM    TRIG 54 02/12/2024 03:54 PM    LDL 48 02/12/2024 03:54 PM    NONHDLC 61 02/12/2024 03:54 PM     The ASCVD Risk score (Jc GUILLORY, et al., 2019) failed to calculate for the following reasons:    The 2019 ASCVD  risk score is only valid for ages 40 to 79       Medications:  Current Outpatient Medications   Medication Sig Dispense Refill    ibuprofen 600 MG Oral Tab Take 1 tablet (600 mg total) by mouth.      cefadroxil 500 MG Oral Cap Take 1 capsule (500 mg total) by mouth 2 (two) times daily.      pantoprazole 40 MG Oral Tab EC Take 1 tablet (40 mg total) by mouth 2 (two) times daily before meals. 120 tablet 0    ferrous sulfate 325 (65 FE) MG Oral Tab EC Take 1 tablet (325 mg total) by mouth daily with breakfast. 60 tablet 0    amphetamine-dextroamphetamine 20 MG Oral Tab Take 1 tablet by mouth 2 (two) times daily.        PMH:  Past Medical History:   Diagnosis Date    ADD (attention deficit disorder)          PSH:  No past surgical history on file.    Allergies:  Allergies   Allergen Reactions    Shellfish-Derived Products NAUSEA AND VOMITING     Swelling in the mouth and itchy       Social History:  Social History     Socioeconomic History    Marital status: Single   Tobacco Use    Smoking status: Former     Types: Cigarettes     Passive exposure: Never    Smokeless tobacco: Never   Vaping Use    Vaping Use: Every day    Substances: Nicotine    Devices: Disposable   Substance and Sexual Activity    Alcohol use: Yes     Comment: social    Drug use: Not Currently     Types: Cannabis     Social Determinants of Health     Food Insecurity: No Food Insecurity (2/12/2024)    Food Insecurity     Food Insecurity: Never true   Transportation Needs: No Transportation Needs (2/12/2024)    Transportation Needs     Lack of Transportation: No   Housing Stability: Low Risk  (2/12/2024)    Housing Stability     Housing Instability: No        Family History:  Family History   Problem Relation Age of Onset    No Known Problems Father     No Known Problems Sister     No Known Problems Brother           REVIEW OF SYSTEMS:   Review of Systems   Constitutional:  Negative for chills, fatigue, fever and unexpected weight change.   HENT:   Negative for congestion, ear pain, hearing loss, rhinorrhea, sinus pain and sore throat.    Eyes:  Negative for pain, redness and visual disturbance.   Respiratory:  Negative for apnea, cough, chest tightness, shortness of breath and wheezing.    Cardiovascular:  Negative for chest pain, palpitations and leg swelling.   Gastrointestinal:  Positive for abdominal pain. Negative for abdominal distention, blood in stool, constipation and nausea.   Endocrine: Negative for cold intolerance, heat intolerance and polyuria.   Genitourinary:  Negative for dysuria, hematuria and urgency.   Musculoskeletal:  Negative for arthralgias, back pain, gait problem, joint swelling, myalgias and neck pain.   Skin:  Negative for rash and wound.        Left upper arm swelling.     Allergic/Immunologic: Negative for food allergies and immunocompromised state.   Neurological:  Negative for dizziness, seizures, facial asymmetry, speech difficulty, weakness, light-headedness, numbness and headaches.   Hematological:  Negative for adenopathy. Does not bruise/bleed easily.   Psychiatric/Behavioral:  Negative for behavioral problems, sleep disturbance and suicidal ideas. The patient is not nervous/anxious.             PHYSICAL EXAM:   /60 (BP Location: Right arm, Patient Position: Sitting, Cuff Size: adult)   Pulse 77   Temp 98.6 °F (37 °C) (Temporal)   Wt 156 lb (70.8 kg)   SpO2 100%   BMI 24.43 kg/m²  Estimated body mass index is 24.43 kg/m² as calculated from the following:    Height as of 2/12/24: 5' 7\" (1.702 m).    Weight as of this encounter: 156 lb (70.8 kg).     Wt Readings from Last 3 Encounters:   02/20/24 156 lb (70.8 kg)   02/12/24 148 lb (67.1 kg)   02/12/24 148 lb (67.1 kg)       Physical Exam  Vitals reviewed.   Constitutional:       General: He is not in acute distress.     Appearance: He is well-developed.   HENT:      Head: Normocephalic and atraumatic.   Eyes:      Conjunctiva/sclera: Conjunctivae normal.       Pupils: Pupils are equal, round, and reactive to light.   Neck:      Thyroid: No thyromegaly.   Cardiovascular:      Rate and Rhythm: Normal rate and regular rhythm.      Heart sounds: Normal heart sounds, S1 normal and S2 normal. No murmur heard.     No friction rub. No gallop.   Pulmonary:      Effort: Pulmonary effort is normal. No respiratory distress.      Breath sounds: Normal breath sounds. No wheezing or rales.   Chest:      Chest wall: No tenderness.   Abdominal:      General: Bowel sounds are normal. There is no distension.      Palpations: Abdomen is soft. There is no mass.      Tenderness: There is abdominal tenderness. There is no guarding or rebound.   Musculoskeletal:         General: No tenderness. Normal range of motion.      Cervical back: Normal range of motion.   Lymphadenopathy:      Cervical: No cervical adenopathy.   Skin:     General: Skin is warm.      Findings: No erythema or rash.      Comments: Left upper arm swelling and tenderness.     Neurological:      Mental Status: He is alert and oriented to person, place, and time.      Cranial Nerves: No cranial nerve deficit.      Deep Tendon Reflexes: Reflexes are normal and symmetric.   Psychiatric:         Behavior: Behavior normal.         Thought Content: Thought content normal.         Judgment: Judgment normal.             ASSESSMENT AND PLAN:   Patient is a 23 year old male who presents primarily presents for:    (Z09) Hospital discharge follow-up  (primary encounter diagnosis)  I reviewed hospitalization with him and follow up recommendations.      (E61.1) Iron deficiency  Plan: CBC With Differential With Platelet, Iron And         Tibc [E], Ferritin [E]       Currently on ferrous sulfate.      (I80.9) Thrombophlebitis  Plan: Completing antibiotics.  Monitor.      (K29.71) Gastritis with hemorrhage, unspecified chronicity, unspecified gastritis type  Plan: Follow up with GI.  Continue PPI.      (K26.9) Duodenal ulcer  Plan: As above.               Health Maintenance:  Health Maintenance   Topic Date Due    Annual Physical  Never done    COVID-19 Vaccine (1) Never done    Pneumococcal Vaccine: Birth to 64yrs (1 of 2 - PCV) Never done    HPV Vaccines (1 - Male 2-dose series) Never done    Tobacco Cessation Counseling 1 Year  Never done    DTaP,Tdap,and Td Vaccines (1 - Tdap) Never done    Influenza Vaccine (1) Never done    Annual Depression Screening  Never done             Meds & Refills for this Visit:  Requested Prescriptions      No prescriptions requested or ordered in this encounter       Orders Placed This Encounter   Procedures    CBC With Differential With Platelet    Iron And Tibc [E]    Ferritin [E]       Imaging & Consults:  None        Nj Randhawa MD     Return in about 3 months (around 5/20/2024).  Important issues to follow up on next visit      Patient indicates understanding of the above recommendations and agrees to the above plan.  Reasurrance and education provided. All questions answered.  Notified to call with any questions, complications, allergies, or worsening or changing symptoms as well as any side effects or complications from the treatments .  Red flags/ ER precautions discussed.    This note was produced using voice recognition software.  As a result, errors may occur.  When identified, these errors have been corrected.  While every attempt is made to correct errors during dictation, errors may still exist.    Total time spent was 62 minutes which includes: Preparation to see patient including chart review, reviewing appropriate medical history, counseling and education (diet and exercise), discussing treatment options, ordering appropriate diagnostic tests and documentation.      Nj Randhawa MD  Internal Medicine/Primary Care  [facility]

## 2024-03-11 ENCOUNTER — OFFICE VISIT (OUTPATIENT)
Facility: CLINIC | Age: 24
End: 2024-03-11

## 2024-03-11 VITALS
WEIGHT: 151 LBS | DIASTOLIC BLOOD PRESSURE: 84 MMHG | BODY MASS INDEX: 23.7 KG/M2 | HEIGHT: 67 IN | SYSTOLIC BLOOD PRESSURE: 134 MMHG | HEART RATE: 96 BPM

## 2024-03-11 DIAGNOSIS — K21.9 GASTROESOPHAGEAL REFLUX DISEASE WITHOUT ESOPHAGITIS: ICD-10-CM

## 2024-03-11 DIAGNOSIS — K26.9 DUODENAL BULB ULCER: Primary | ICD-10-CM

## 2024-03-11 RX ORDER — PANTOPRAZOLE SODIUM 40 MG/1
TABLET, DELAYED RELEASE ORAL
Qty: 120 TABLET | Refills: 0 | Status: SHIPPED | OUTPATIENT
Start: 2024-03-11 | End: 2024-06-09

## 2024-03-11 NOTE — PATIENT INSTRUCTIONS
-go to lab to have blood work done (ordered by Dr. Celeste)    -avoid NSAIDs (ibuprofen, motrin, advil, aleve). If you need pain med take tylenol    -continue pantoprazole twice daily for another 4 weeks, then take once daily.        1. Schedule upper endoscopy (EGD) with Dr. Marmolejo, Dr. Story or Dr. Valles  [Diagnosis: follow up EGD for duodenal bulb ulcer & erosive esophagitis]    2. If you start any NEW medication after your visit today, please notify us. Certain medications will need to be held before the procedure, or the procedure cannot be performed safely.     3. DO NOT TAKE: Iron (ferrous sulfate/ ferrous gluconate) pills, herbal supplements, multivitamins, or diet medications (i.e. Phentermine/Vyvanse) for 7 days before exam.  DO NOT TAKE: Any form of alcohol, recreational drugs and any forms of Erectile Dysfunction medications 24 hours prior to procedure.    4. The day BEFORE your procedure, NOTHING TO EAT OR DRINK AFTER MIDNIGHT! If your procedure is scheduled in the afternoon, you may have clear liquids only up to 3 hours before the time of your procedure. If you fail to keep your stomach empty for 3 hours prior to procedure time, your procedure may be CANCELLED. Instructions can also be found at: [email or web address]

## 2024-03-11 NOTE — H&P
[facility] - Gastroenterology                                                                                                               Reason for consult: hospital follow up     Requesting physician or provider: Nj Randhawa MD    Chief Complaint   Patient presents with    Follow - Up     Check up.       HPI:   Jacob Patton is a 23 year old year-old male with medical history including ADD.    he is here today for hospital follow up  -admitted to hospital in February for epigastric abd pain & anemia, labs on admission notable for hemoglobin 6.1 status post 1 unit PRBC with appropriate response to hemoglobin 7 1.  Iron studies consistent with profound iron deficiency anemia. CT with diffuse wall thickening of proximal duodenum. EGD showed large duodenal bulb ulcer with luminal narrowing, Biopsies neg for H. Pylori   -since hospital he has been feeling well, fatigue has improved. He ran out of pantoprazole about 4 days ago, he is having epigastric pain since yesterday. One episode of vomiting on Saturday, had nausea yesterday.     Patient denies symptoms of dysphagia, odynophagia, globus sensation, heartburn, hematemesis, change in bowel habits, constipation, diarrhea, hematochezia, or melena. he denies recent change in appetite, fever or unintentional weight loss.      Last colonoscopy: none   Last EGD: 2/13/24 Dr. Reyna  Impression:   1. Large duodenal bulb ulcer with luminal narrowing, biopsied  2. LA grade B erosive esophagitis    A. Duodenal ulcer; biopsy:  Ulcerated small bowel mucosa with mild chronic and focal active inflammation.  Preserved villous architecture.  No increase in intraepithelial lymphocytes.  Diff-Quik stain (with appropriate control reactivity) is negative for H. pylori microorganisms.     B. Random gastric biopsy:   Gastric mucosa with mild to moderate chronic inactive  gastritis.  No dysplasia or metaplasia is identified.  Diff-Quik stain (with appropriate control reactivity) is negative for H. pylori microorganisms.  H. pylori immunohistochemical stain is negative for H. pylori microorganisms.  See comment.    NSAIDS: none   Tobacco: former  Alcohol: social use  Marijuana: former  Illicit drugs: none     FH GI malignancy: none   FH IBD: none     No history of adverse reaction to sedation  No SARWAT  No anticoagulants  No pacemaker/defibrillator  No pain medications and/or sleep aides    Wt Readings from Last 6 Encounters:   24 151 lb (68.5 kg)   24 156 lb (70.8 kg)   24 148 lb (67.1 kg)   24 148 lb (67.1 kg)        History, Medications, Allergies, ROS:      Past Medical History:   Diagnosis Date    ADD (attention deficit disorder)       History reviewed. No pertinent surgical history.   Family Hx:   Family History   Problem Relation Age of Onset    No Known Problems Father     No Known Problems Sister     No Known Problems Brother       Social History:   Social History     Socioeconomic History    Marital status: Single   Tobacco Use    Smoking status: Former     Types: Cigarettes     Quit date:      Years since quittin.1     Passive exposure: Never    Smokeless tobacco: Never   Vaping Use    Vaping Use: Every day    Substances: Nicotine    Devices: Disposable   Substance and Sexual Activity    Alcohol use: Yes     Comment: social    Drug use: Not Currently     Types: Cannabis     Comment: former     Social Determinants of Health     Food Insecurity: No Food Insecurity (2024)    Food Insecurity     Food Insecurity: Never true   Transportation Needs: No Transportation Needs (2024)    Transportation Needs     Lack of Transportation: No   Housing Stability: Low Risk  (2024)    Housing Stability     Housing Instability: No        Medications (Active prior to today's visit):  Current Outpatient Medications   Medication Sig Dispense Refill     pantoprazole 40 MG Oral Tab EC Take 1 tablet (40 mg total) by mouth 2 (two) times a day for 30 days, THEN 1 tablet (40 mg total) every morning before breakfast. 120 tablet 0    pantoprazole 40 MG Oral Tab EC Take 1 tablet (40 mg total) by mouth 2 (two) times daily before meals. 120 tablet 0    ferrous sulfate 325 (65 FE) MG Oral Tab EC Take 1 tablet (325 mg total) by mouth daily with breakfast. 60 tablet 0    amphetamine-dextroamphetamine 20 MG Oral Tab Take 1 tablet by mouth 2 (two) times daily.         Allergies:  Allergies   Allergen Reactions    Shellfish-Derived Products NAUSEA AND VOMITING     Swelling in the mouth and itchy        ROS:   CONSTITUTIONAL: negative for fevers, chills, sweats  EYES Negative for scleral icterus or redness, and diplopia  HEENT: Negative for hoarseness  RESPIRATORY: Negative for cough and severe shortness of breath  CARDIOVASCULAR: Negative for crushing sub-sternal chest pain  GASTROINTESTINAL: See HPI  GENITOURINARY: Negative for dysuria  MUSCULOSKELETAL: Negative for arthralgias and myalgias  SKIN: Negative for jaundice, rash or pruritus  NEUROLOGICAL: Negative for dizziness and headaches  BEHAVIOR/PSYCH: Negative for psychotic behavior    PHYSICAL EXAM:   Blood pressure 134/84, pulse 96, height 5' 7\" (1.702 m), weight 151 lb (68.5 kg).    GEN: Alert, no acute distress, well-nourished   HEENT: anicteric sclera, neck supple, trachea midline, MMM, no palpable or tender neck or supraclavicular lymph nodes  CV: RRR, the extremities are warm and well perfused   LUNGS: No increased work of breathing, CTAB  ABDOMEN: Soft, symmetrical, non-tender without distention or guarding. No scars or lesions. Aorta is without bruit or visible pulsation. Umbilicus is midline without herniation. Normoactive bowel sounds are present, No masses, hepatomegaly or splenomegaly noted.  MSK: No erythema, no warmth, no swelling of joints  SKIN: No jaundice, no erythema, no rashes, no lesions  HEMATOLOGIC: No  bleeding, no bruising  NEURO: Alert and interactive, GARIBAY  PSYCH: appropriate mood & affect    Labs/Imaging/Procedures:     Patient's pertinent labs and imaging were reviewed and discussed with patient today.        .  ASSESSMENT/PLAN:   Jacob Patton is a 23 year old year-old male with medical history including ADD.    he is here today for hospital follow up  -admitted to hospital in February for epigastric abd pain & anemia, labs on admission notable for hemoglobin 6.1 status post 1 unit PRBC with appropriate response to hemoglobin 7 1.  Iron studies consistent with profound iron deficiency anemia. CT with diffuse wall thickening of proximal duodenum. EGD showed large duodenal bulb ulcer with luminal narrowing, Biopsies neg for H. Pylori   -since hospital he has been feeling well, fatigue has improved. He ran out of pantoprazole about 4 days ago, he is having epigastric pain since yesterday. One episode of vomiting on Saturday, had nausea yesterday.   -etiology unclear. Did not have NSAID use near time of admission, H. Pylori negative. Patient feeling better on PPI. Will refill pantoprazole and order repeat EGD. Return & ER precautions discussed.    Recommendations:  -go to lab to have blood work done (ordered by Dr. Celeste)    -avoid NSAIDs (ibuprofen, motrin, advil, aleve). If you need pain med take tylenol    -continue pantoprazole twice daily for another 4 weeks, then take once daily.      -Schedule upper endoscopy (EGD) with Dr. Marmolejo, Dr. Story or Dr. Valles  [Diagnosis: follow up EGD for duodenal bulb ulcer & erosive esophagitis]        Orders This Visit:  No orders of the defined types were placed in this encounter.      Meds This Visit:  Requested Prescriptions     Signed Prescriptions Disp Refills    pantoprazole 40 MG Oral Tab  tablet 0     Sig: Take 1 tablet (40 mg total) by mouth 2 (two) times a day for 30 days, THEN 1 tablet (40 mg total) every morning before breakfast.        Imaging & Referrals:  None      PHYLLIS Yao    [facility] Gastroenterology  3/11/2024        This note was partially prepared using Dragon Medical voice recognition dictation software. As a result, errors may occur. When identified, these errors have been corrected. While every attempt is made to correct errors during dictation, discrepancies may still exist.

## 2024-03-12 ENCOUNTER — OFFICE VISIT (OUTPATIENT)
Dept: INTERNAL MEDICINE CLINIC | Facility: CLINIC | Age: 24
End: 2024-03-12
Payer: COMMERCIAL

## 2024-03-12 VITALS
OXYGEN SATURATION: 99 % | DIASTOLIC BLOOD PRESSURE: 64 MMHG | WEIGHT: 150 LBS | HEART RATE: 110 BPM | SYSTOLIC BLOOD PRESSURE: 118 MMHG | TEMPERATURE: 98 F | BODY MASS INDEX: 23 KG/M2

## 2024-03-12 DIAGNOSIS — Z09 FOLLOW-UP EXAM: Primary | ICD-10-CM

## 2024-03-12 DIAGNOSIS — E61.1 IRON DEFICIENCY: ICD-10-CM

## 2024-03-12 DIAGNOSIS — K26.9 DUODENAL ULCER: ICD-10-CM

## 2024-03-12 PROCEDURE — 3074F SYST BP LT 130 MM HG: CPT | Performed by: INTERNAL MEDICINE

## 2024-03-12 PROCEDURE — 3078F DIAST BP <80 MM HG: CPT | Performed by: INTERNAL MEDICINE

## 2024-03-12 PROCEDURE — 99214 OFFICE O/P EST MOD 30 MIN: CPT | Performed by: INTERNAL MEDICINE

## 2024-03-13 ENCOUNTER — TELEPHONE (OUTPATIENT)
Facility: CLINIC | Age: 24
End: 2024-03-13

## 2024-03-13 DIAGNOSIS — K26.9 DUODENAL BULB ULCER: Primary | ICD-10-CM

## 2024-03-13 DIAGNOSIS — K22.10 EROSIVE ESOPHAGITIS: ICD-10-CM

## 2024-03-13 NOTE — PROGRESS NOTES
[facility]  Return Patient      HPI:     Chief Complaint   Patient presents with    Follow - Up       Jacob Patton is a 23 year old male presenting for:  Hospital follow up.  Has  has a past medical history of ADD (attention deficit disorder).    Admitted earlier this year y after initially seeing me in clinic and labs revealing Hgb of 6.1.  Iron deficiency anemia. CT revealed diffuse wall thickening with mucosal hyperenhancement.  GI performed EGD which revealed duodenal bulb ulcer with luminal narrowing.  Biopsy benign.  On PPI BID.    He overall is feeling better.        Labs:   Complete Metabolic Panel:  Lab Results   Component Value Date/Time     02/14/2024 06:19 AM    K 3.8 02/14/2024 06:19 AM     02/14/2024 06:19 AM    CO2 26.0 02/14/2024 06:19 AM    CREATSERUM 0.89 02/14/2024 06:19 AM    CA 9.3 02/14/2024 06:19 AM    GLU 83 02/14/2024 06:19 AM    TP 8.1 02/12/2024 03:54 PM    ALB 4.6 02/12/2024 03:54 PM    ALKPHO 106 02/12/2024 03:54 PM    AST 11 02/12/2024 03:54 PM    ALT 9 (L) 02/12/2024 03:54 PM    BILT 0.3 02/12/2024 03:54 PM    TSH 1.322 02/12/2024 03:54 PM        CBC:  @LABRCNTIP(RBC:3,HGB:3,HCT:3,MCV:3,MCH:3,MCHC:3,RDW:3,NEPRELIM:3,WBC:3,PLT:3)@       Hemoglobin A1C, Microalbumin  Lab Results   Component Value Date/Time    A1C 5.1 02/12/2024 03:54 PM        Lipid panel  Lab Results   Component Value Date/Time    CHOLEST 96 02/12/2024 03:54 PM    HDL 35 (L) 02/12/2024 03:54 PM    TRIG 54 02/12/2024 03:54 PM    LDL 48 02/12/2024 03:54 PM    NONHDLC 61 02/12/2024 03:54 PM     The ASCVD Risk score (Jc DK, et al., 2019) failed to calculate for the following reasons:    The 2019 ASCVD risk score is only valid for ages 40 to 79       Medications:  Current Outpatient Medications   Medication Sig Dispense Refill    pantoprazole 40 MG Oral Tab EC Take 1 tablet (40 mg total) by mouth 2 (two) times a day for 30 days, THEN 1 tablet (40 mg total) every morning  before breakfast. 120 tablet 0    pantoprazole 40 MG Oral Tab EC Take 1 tablet (40 mg total) by mouth 2 (two) times daily before meals. 120 tablet 0    ferrous sulfate 325 (65 FE) MG Oral Tab EC Take 1 tablet (325 mg total) by mouth daily with breakfast. 60 tablet 0    amphetamine-dextroamphetamine 20 MG Oral Tab Take 1 tablet by mouth 2 (two) times daily.        PMH:  Past Medical History:   Diagnosis Date    ADD (attention deficit disorder)          PSH:  No past surgical history on file.    Allergies:  Allergies   Allergen Reactions    Shellfish-Derived Products NAUSEA AND VOMITING     Swelling in the mouth and itchy       Social History:  Social History     Socioeconomic History    Marital status: Single   Tobacco Use    Smoking status: Former     Types: Cigarettes     Quit date:      Years since quittin.1     Passive exposure: Never    Smokeless tobacco: Never   Vaping Use    Vaping Use: Every day    Substances: Nicotine    Devices: Disposable   Substance and Sexual Activity    Alcohol use: Yes     Comment: social    Drug use: Not Currently     Types: Cannabis     Comment: former     Social Determinants of Health     Food Insecurity: No Food Insecurity (2024)    Food Insecurity     Food Insecurity: Never true   Transportation Needs: No Transportation Needs (2024)    Transportation Needs     Lack of Transportation: No   Housing Stability: Low Risk  (2024)    Housing Stability     Housing Instability: No        Family History:  Family History   Problem Relation Age of Onset    No Known Problems Father     No Known Problems Sister     No Known Problems Brother           REVIEW OF SYSTEMS:   Review of Systems   Constitutional:  Negative for chills, fatigue, fever and unexpected weight change.   HENT:  Negative for congestion, ear pain, hearing loss, rhinorrhea, sinus pain and sore throat.    Eyes:  Negative for pain, redness and visual disturbance.   Respiratory:  Negative for apnea, cough,  chest tightness, shortness of breath and wheezing.    Cardiovascular:  Negative for chest pain, palpitations and leg swelling.   Gastrointestinal:  Negative for abdominal distention, abdominal pain, blood in stool, constipation and nausea.   Endocrine: Negative for cold intolerance, heat intolerance and polyuria.   Genitourinary:  Negative for dysuria, hematuria and urgency.   Musculoskeletal:  Negative for arthralgias, back pain, gait problem, joint swelling, myalgias and neck pain.   Skin:  Negative for rash and wound.   Allergic/Immunologic: Negative for food allergies and immunocompromised state.   Neurological:  Negative for dizziness, seizures, facial asymmetry, speech difficulty, weakness, light-headedness, numbness and headaches.   Hematological:  Negative for adenopathy. Does not bruise/bleed easily.   Psychiatric/Behavioral:  Negative for behavioral problems, sleep disturbance and suicidal ideas. The patient is not nervous/anxious.             PHYSICAL EXAM:   /64 (BP Location: Right arm, Patient Position: Sitting, Cuff Size: adult)   Pulse 110   Temp 97.9 °F (36.6 °C) (Temporal)   Wt 150 lb (68 kg)   SpO2 99%   BMI 23.49 kg/m²  Estimated body mass index is 23.49 kg/m² as calculated from the following:    Height as of 3/11/24: 5' 7\" (1.702 m).    Weight as of this encounter: 150 lb (68 kg).     Wt Readings from Last 3 Encounters:   03/12/24 150 lb (68 kg)   03/11/24 151 lb (68.5 kg)   02/20/24 156 lb (70.8 kg)       Physical Exam  Vitals reviewed.   Constitutional:       General: He is not in acute distress.     Appearance: He is well-developed.   HENT:      Head: Normocephalic and atraumatic.   Eyes:      Conjunctiva/sclera: Conjunctivae normal.      Pupils: Pupils are equal, round, and reactive to light.   Neck:      Thyroid: No thyromegaly.   Cardiovascular:      Rate and Rhythm: Normal rate and regular rhythm.      Heart sounds: Normal heart sounds, S1 normal and S2 normal. No murmur  heard.     No friction rub. No gallop.   Pulmonary:      Effort: Pulmonary effort is normal. No respiratory distress.      Breath sounds: Normal breath sounds. No wheezing or rales.   Chest:      Chest wall: No tenderness.   Abdominal:      General: Bowel sounds are normal. There is no distension.      Palpations: Abdomen is soft. There is no mass.      Tenderness: There is no abdominal tenderness. There is no guarding or rebound.   Musculoskeletal:         General: No tenderness. Normal range of motion.      Cervical back: Normal range of motion.   Lymphadenopathy:      Cervical: No cervical adenopathy.   Skin:     General: Skin is warm.      Findings: No erythema or rash.   Neurological:      Mental Status: He is alert and oriented to person, place, and time.      Cranial Nerves: No cranial nerve deficit.      Deep Tendon Reflexes: Reflexes are normal and symmetric.   Psychiatric:         Behavior: Behavior normal.         Thought Content: Thought content normal.         Judgment: Judgment normal.             ASSESSMENT AND PLAN:   Patient is a 23 year old male who presents primarily presents for:    (Z09) Follow-up exam  (primary encounter diagnosis)  Comment: disability forms filled out to cover times during hospitalization.        (K26.9) Duodenal ulcer    (E61.1) Iron deficiency  Plan: Stable.                  Health Maintenance:  Health Maintenance   Topic Date Due    Annual Physical  Never done    COVID-19 Vaccine (1) Never done    Pneumococcal Vaccine: Birth to 64yrs (1 of 2 - PCV) Never done    HPV Vaccines (1 - Male 2-dose series) Never done    Tobacco Cessation Counseling 1 Year  Never done    DTaP,Tdap,and Td Vaccines (1 - Tdap) Never done    Influenza Vaccine (1) Never done    Annual Depression Screening  Never done             Meds & Refills for this Visit:  Requested Prescriptions      No prescriptions requested or ordered in this encounter       No orders of the defined types were placed in this  encounter.      Imaging & Consults:  None        Nj Randhawa MD     Return in about 6 months (around 9/12/2024) for Annual Physical.  Important issues to follow up on next visit      Patient indicates understanding of the above recommendations and agrees to the above plan.  Reasurrance and education provided. All questions answered.  Notified to call with any questions, complications, allergies, or worsening or changing symptoms as well as any side effects or complications from the treatments .  Red flags/ ER precautions discussed.    This note was produced using voice recognition software.  As a result, errors may occur.  When identified, these errors have been corrected.  While every attempt is made to correct errors during dictation, errors may still exist.    Total time spent was 62 minutes which includes: Preparation to see patient including chart review, reviewing appropriate medical history, counseling and education (diet and exercise), discussing treatment options, ordering appropriate diagnostic tests and documentation.      Nj Randhawa MD  Internal Medicine/Primary Care  [facility]

## 2024-03-13 NOTE — TELEPHONE ENCOUNTER
Scheduled for:  EGD 67982  Provider Name:    Date:  5/17/2024  Location:  [facility]  Sedation:  MAC  Time:  11:30 am, (pt is aware that [facility] will call the day before to confirm arrival time)  Prep:  NPO After midnight  Meds/Allergies Reconciled?: Antonina/PHYLLIS reviewed.   Diagnosis with codes: duodenal bulb ulcer K26.9, erosive esophagitis K22.10   Was patient informed to call insurance with codes (Y/N): Yes    Referral sent?: Referral was sent at the time of electronic surgical scheduling.   [facility] or [facility] notified?:  Electronic case request was sent to [facility] via [employer].   Medication Orders:  Patient is aware to NOT take iron pills, herbal meds and diet supplements for 7 days before exam. Also to NOT take any form of alcohol, recreational drugs and any forms of ED meds 24-72 hours before exam.   Misc Orders:  N/A     Further instructions given by staff: I discussed the prep instructions with the patient which he verbally understood. Provided patient with prep instructions and cancellation policy at the time of office visit.

## 2024-05-17 PROCEDURE — 88312 SPECIAL STAINS GROUP 1: CPT | Performed by: INTERNAL MEDICINE

## 2024-05-17 PROCEDURE — 88305 TISSUE EXAM BY PATHOLOGIST: CPT | Performed by: INTERNAL MEDICINE

## 2024-05-21 DIAGNOSIS — D50.9 IRON DEFICIENCY ANEMIA, UNSPECIFIED IRON DEFICIENCY ANEMIA TYPE: ICD-10-CM

## 2024-05-21 DIAGNOSIS — K29.80 DUODENITIS: Primary | ICD-10-CM

## 2024-05-21 DIAGNOSIS — L92.9 NON-CASEATING GRANULOMA: ICD-10-CM

## 2024-07-02 ENCOUNTER — TELEPHONE (OUTPATIENT)
Facility: CLINIC | Age: 24
End: 2024-07-02

## 2024-07-02 NOTE — TELEPHONE ENCOUNTER
1st reminder letter sent out via mail and [employer] for the following:   MRI ENTEROGRAPHY ABD/PLELODIA (W+WO)ROSA ISELA(CPT=72197/43139) (Order #[id number]) on 5/21/24

## 2024-10-02 ENCOUNTER — HOSPITAL ENCOUNTER (EMERGENCY)
Facility: HOSPITAL | Age: 24
Discharge: HOME | End: 2024-10-02
Attending: EMERGENCY MEDICINE
Payer: COMMERCIAL

## 2024-10-02 VITALS
BODY MASS INDEX: 22.9 KG/M2 | DIASTOLIC BLOOD PRESSURE: 74 MMHG | TEMPERATURE: 98 F | RESPIRATION RATE: 16 BRPM | HEART RATE: 100 BPM | OXYGEN SATURATION: 98 % | WEIGHT: 160 LBS | SYSTOLIC BLOOD PRESSURE: 132 MMHG | HEIGHT: 70 IN

## 2024-10-02 DIAGNOSIS — K92.1 BLACK STOOLS: Primary | ICD-10-CM

## 2024-10-02 LAB
ANION GAP SERPL CALC-SCNC: 7 MMOL/L (ref 0–18)
BASOPHILS # BLD AUTO: 0.06 X10(3) UL (ref 0–0.2)
BASOPHILS NFR BLD AUTO: 0.6 %
BUN BLD-MCNC: 14 MG/DL (ref 9–23)
BUN/CREAT SERPL: 16.7 (ref 10–20)
CALCIUM BLD-MCNC: 8.9 MG/DL (ref 8.7–10.4)
CHLORIDE SERPL-SCNC: 107 MMOL/L (ref 98–112)
CO2 SERPL-SCNC: 25 MMOL/L (ref 21–32)
CREAT BLD-MCNC: 0.84 MG/DL
DEPRECATED RDW RBC AUTO: 44 FL (ref 35.1–46.3)
EGFRCR SERPLBLD CKD-EPI 2021: 126 ML/MIN/1.73M2 (ref 60–?)
EOSINOPHIL # BLD AUTO: 0.22 X10(3) UL (ref 0–0.7)
EOSINOPHIL NFR BLD AUTO: 2.1 %
ERYTHROCYTE [DISTWIDTH] IN BLOOD BY AUTOMATED COUNT: 15.1 % (ref 11–15)
GLUCOSE BLD-MCNC: 73 MG/DL (ref 70–99)
HCT VFR BLD AUTO: 32.3 %
HGB BLD-MCNC: 10.5 G/DL
IMM GRANULOCYTES # BLD AUTO: 0.07 X10(3) UL (ref 0–1)
IMM GRANULOCYTES NFR BLD: 0.7 %
LIPASE SERPL-CCNC: 50 U/L (ref 12–53)
LYMPHOCYTES # BLD AUTO: 2.64 X10(3) UL (ref 1–4)
LYMPHOCYTES NFR BLD AUTO: 25.5 %
MCH RBC QN AUTO: 26.4 PG (ref 26–34)
MCHC RBC AUTO-ENTMCNC: 32.5 G/DL (ref 31–37)
MCV RBC AUTO: 81.2 FL
MONOCYTES # BLD AUTO: 0.6 X10(3) UL (ref 0.1–1)
MONOCYTES NFR BLD AUTO: 5.8 %
NEUTROPHILS # BLD AUTO: 6.77 X10 (3) UL (ref 1.5–7.7)
NEUTROPHILS # BLD AUTO: 6.77 X10(3) UL (ref 1.5–7.7)
NEUTROPHILS NFR BLD AUTO: 65.3 %
OSMOLALITY SERPL CALC.SUM OF ELEC: 287 MOSM/KG (ref 275–295)
PLATELET # BLD AUTO: 276 10(3)UL (ref 150–450)
POTASSIUM SERPL-SCNC: 3.6 MMOL/L (ref 3.5–5.1)
RBC # BLD AUTO: 3.98 X10(6)UL
SODIUM SERPL-SCNC: 139 MMOL/L (ref 136–145)
WBC # BLD AUTO: 10.4 X10(3) UL (ref 4–11)

## 2024-10-02 PROCEDURE — 36415 COLL VENOUS BLD VENIPUNCTURE: CPT

## 2024-10-02 PROCEDURE — 99283 EMERGENCY DEPT VISIT LOW MDM: CPT

## 2024-10-02 PROCEDURE — 83690 ASSAY OF LIPASE: CPT | Performed by: EMERGENCY MEDICINE

## 2024-10-02 PROCEDURE — 99284 EMERGENCY DEPT VISIT MOD MDM: CPT

## 2024-10-02 PROCEDURE — 85025 COMPLETE CBC W/AUTO DIFF WBC: CPT | Performed by: EMERGENCY MEDICINE

## 2024-10-02 PROCEDURE — 80048 BASIC METABOLIC PNL TOTAL CA: CPT | Performed by: EMERGENCY MEDICINE

## 2024-10-02 RX ORDER — PANTOPRAZOLE SODIUM 40 MG/1
40 TABLET, DELAYED RELEASE ORAL DAILY
Qty: 30 TABLET | Refills: 0 | Status: SHIPPED | OUTPATIENT
Start: 2024-10-02 | End: 2024-11-01

## 2024-10-02 NOTE — ED INITIAL ASSESSMENT (HPI)
Patient presents to ER with concerns of dark black stools.   Patient notes noticed this since this past Saturday, concerned for bleeding.   No medications, no thinners  Admits to mild abdominal discomfort.

## 2024-10-02 NOTE — ED PROVIDER NOTES
Patient Seen in: [facility] Emergency Department    History     Chief Complaint   Patient presents with    GI Bleeding       HPI    24-year-old male presents ER with complaint of black stools for the past 2 days.  Patient with a past medical history of GI bleed in the past for a duodenal ulcer.  Patient states at that time his hemoglobin was 6.7.  Patient is concerned about the black stools.  Patient denies any abdominal pain.  Patient denies any nausea or diarrhea    History reviewed.   Past Medical History:    ADD (attention deficit disorder)       History reviewed. History reviewed. No pertinent surgical history.      Medications :  (Not in a hospital admission)       Family History   Problem Relation Age of Onset    No Known Problems Father     No Known Problems Sister     No Known Problems Brother        Smoking Status:   Social History     Socioeconomic History    Marital status: Single   Tobacco Use    Smoking status: Some Days     Current packs/day: 0.00     Types: Cigarettes     Last attempt to quit:      Years since quittin.7     Passive exposure: Never    Smokeless tobacco: Never   Vaping Use    Vaping status: Every Day    Substances: Nicotine    Devices: Disposable   Substance and Sexual Activity    Alcohol use: Yes     Comment: social    Drug use: Not Currently     Types: Cannabis     Comment: former       ROS  All pertinent positives for the review of systems are mentioned in the HPI  All other organ systems are reviewed and are negative.    Constitutional and vital signs reviewed.      Social History and Family History elements reviewed from today, pertinent positives to the presenting problem noted.    Physical Exam     ED Triage Vitals [10/02/24 1126]   /73   Pulse 99   Resp 18   Temp 98.4 °F (36.9 °C)   Temp src    SpO2 96 %   O2 Device        All measures to prevent infection transmission during my interaction with the patient were taken. The patient was already wearing a  droplet mask on my arrival to the room. Personal protective equipment including droplet mask, eye protection, and gloves were worn throughout the duration of the exam.  Handwashing was performed prior to and after the exam.  Stethoscope and any equipment used during my examination was cleaned with super sani-cloth germicidal wipes following the exam.     Physical Exam  Vitals and nursing note reviewed.   Constitutional:       Appearance: He is well-developed.   HENT:      Head: Normocephalic and atraumatic.      Right Ear: External ear normal.      Left Ear: External ear normal.      Nose: Nose normal.   Eyes:      Conjunctiva/sclera: Conjunctivae normal.      Pupils: Pupils are equal, round, and reactive to light.   Cardiovascular:      Rate and Rhythm: Normal rate and regular rhythm.      Heart sounds: Normal heart sounds.   Pulmonary:      Effort: Pulmonary effort is normal.      Breath sounds: Normal breath sounds.   Abdominal:      General: Bowel sounds are normal.      Palpations: Abdomen is soft.   Musculoskeletal:         General: Normal range of motion.      Cervical back: Normal range of motion and neck supple.   Skin:     General: Skin is warm and dry.   Neurological:      Mental Status: He is alert and oriented to person, place, and time.      Deep Tendon Reflexes: Reflexes are normal and symmetric.   Psychiatric:         Behavior: Behavior normal.         Thought Content: Thought content normal.         Judgment: Judgment normal.         ED Course        Labs Reviewed   CBC WITH DIFFERENTIAL WITH PLATELET - Abnormal; Notable for the following components:       Result Value    RBC 3.98 (*)     HGB 10.5 (*)     HCT 32.3 (*)     RDW 15.1 (*)     All other components within normal limits   BASIC METABOLIC PANEL (8) - Normal   LIPASE - Normal         Imaging Results Available and Reviewed while in ED: No results found.  ED Medications Administered: Medications - No data to display      MDM     Vitals:     10/02/24 1126   BP: 141/73   Pulse: 99   Resp: 18   Temp: 98.4 °F (36.9 °C)   SpO2: 96%   Weight: 72.6 kg   Height: 177.8 cm (5' 10\")     *I personally reviewed and interpreted all ED vitals.  I also personally reviewed all labs and imaging if ordered    Pulse Ox: 96%, Room air, Normal     Monitor Interpretation:   normal sinus rhythm    Differential Diagnosis/ Diagnostic Considerations: GI bleed, gastritis, gastroenteritis.    Medical Record Review: I personally reviewed available prior medical records for any recent pertinent discharge summaries, testing, and procedures and reviewed those reports.    Complicating Factors: The patient already has does not have any pertinent problems on file. to contribute to the complexity of this ED evaluation.    Medical Decision Making  24-year-old male presents ER with complaint of black stools for the past few days.  Patient denies any abdominal pain.  Patient vital signs stable.  Patient's hemoglobin baseline.  Patient will be placed on Protonix.  Patient instructed to follow-up with GI if he continues to have black stools or he has symptoms of weakness.  Patient's family member bedside made aware of the discharge planning disposition.    Problems Addressed:  Black stools: acute illness or injury    Amount and/or Complexity of Data Reviewed  Independent Historian:      Details: Patient's mother states he been complaint of black stool on and off for the past 2 days but no abdominal pain  External Data Reviewed: notes.     Details: Patient's previous admissions reviewed.  Patient had endoscopy in the past for GI bleed secondary to duodenal ulcer  Labs: ordered. Decision-making details documented in ED Course.    Risk  Prescription drug management.        Condition upon leaving the department: Stable    Disposition and Plan     Clinical Impression:  1. Black stools        Disposition:  Discharge    Follow-up:  Atif Story MD  [address]  [facility]  [address]  [phone]    Schedule an appointment as soon as possible for a visit  If symptoms worsen      Medications Prescribed:  Current Discharge Medication List        START taking these medications    Details   pantoprazole 40 MG Oral Tab EC Take 1 tablet (40 mg total) by mouth daily.  Qty: 30 tablet, Refills: 0

## (undated) DEVICE — TUBING SUCT L12FT DIA6MM CLR N CNDTVE W/ MAXI

## (undated) DEVICE — KIT ENDO ORCAPOD 160/180/190

## (undated) DEVICE — TUBE SUCT STD TRNSPAR RIG W/ BLB TIP RIB 5IN1

## (undated) DEVICE — BITE BLK L LUMN SZ 20X27MM GRN PLAS FLX SIDE

## (undated) DEVICE — Device

## (undated) DEVICE — CANNULA NASAL ADULT PIGTAIL L7

## (undated) DEVICE — SYRINGE REGULAR TIP 60ML

## (undated) DEVICE — TUBING SCT CLR 6FT .25IN MDVC

## (undated) DEVICE — KIT CLEAN ENDOKIT 1.1OZ GOWNX2

## (undated) NOTE — LETTER
[facility]  Administration of Anesthesia  I, Jacob Patton agree to be cared for by a physician anesthesiologist alone and/or with a nurse anesthetist, who is specially trained to monitor me and give me medicine to put me to sleep or keep me comfortable during my procedure    I understand that my anesthesiologist and/or anesthetist is not an employee or agent of [facility] or [employer]. He or she works for [facility]    As the patient asking for anesthesia services, I agree to:  Allow the anesthesiologist (anesthesia doctor) to give me medicine and do additional procedures as necessary. Some examples are: Starting or using an “IV” to give me medicine, fluids or blood during my procedure, and having a breathing tube placed to help me breathe when I’m asleep (intubation). In the event that my heart stops working properly, I understand that my anesthesiologist will make every effort to sustain my life, unless otherwise directed by [facility] Do Not Resuscitate documents.  Tell my anesthesia doctor before my procedure:  If I am pregnant.  The last time that I ate or drank.  iii. All of the medicines I take (including prescriptions, herbal supplements, and pills I can buy without a prescription (including street drugs/illegal medications). Failure to inform my anesthesiologist about these medicines may increase my risk of anesthetic complications.  iv.If I am allergic to anything or have had a reaction to anesthesia before.  I understand how the anesthesia medicine will help me (benefits).  I understand that with any type of anesthesia medicine there are risks:  The most common risks are: nausea, vomiting, sore throat, muscle soreness, damage to my eyes, mouth, or teeth (from breathing tube placement).  Rare risks include: remembering what happened during my procedure, allergic reactions to medications, injury to my airway, heart, lungs, vision, nerves,  or muscles and in extremely rare instances death.  My doctor has explained to me other choices available to me for my care (alternatives).  Pregnant Patients (“epidural”):  I understand that the risks of having an epidural (medicine given into my back to help control pain during labor), include itching, low blood pressure, difficulty urinating, headache or slowing of the baby’s heart. Very rare risks include infection, bleeding, seizure, irregular heart rhythms and nerve injury.  Regional Anesthesia (“spinal”, “epidural”, & “nerve blocks”):  I understand that rare but potential complications include headache, bleeding, infection, seizure, irregular heart rhythms, and nerve injury.    _____________________________________________________________________________  Patient (or Representative) Signature/Relationship to Patient  Date   Time    _____________________________________________________________________________   Name (if used)    Language/Organization   Time    _____________________________________________________________________________  Nurse Anesthetist Signature     Date   Time  _____________________________________________________________________________  Anesthesiologist Signature     Date   Time  I have discussed the procedure and information above with the patient (or patient’s representative) and answered their questions. The patient or their representative has agreed to have anesthesia services.    _____________________________________________________________________________  Witness        Date   Time  I have verified that the signature is that of the patient or patient’s representative, and that it was signed before the procedure  Patient Name: Jacob Patton     : 2000                 Printed: 2024 at 9:48 AM    Medical Record #: [id number]                                            Page 1 of 1  ----------ANESTHESIA CONSENT----------

## (undated) NOTE — LETTER
[place] [address]  Authorization for Invasive Procedures  Date: 2/13/2024           Time: 0943    I hereby authorize Mayuri Reyna MD , my physician and his/her assistants (if applicable), which may include medical students, residents, and/or fellows, to perform the following surgical operation/ procedure and administer such anesthesia as may be determined necessary by my physician: esophagogastroduodenoscopy  on Jacob Patton  2.   I recognize that during the surgical operation/procedure, unforeseen conditions may necessitate additional or different procedures than those listed above.  I, therefore, further authorize and request that the above-named surgeon, assistants, or designees perform such procedures as are, in their judgment, necessary and desirable.    3.   My surgeon/physician has discussed prior to my surgery the potential benefits, risks and side effects of this procedure; the likelihood of achieving goals; and potential problems that might occur during recuperation.  They also discussed reasonable alternatives to the procedure, including risks, benefits, and side effects related to the alternatives and risks related to not receiving this procedure.  I have had all my questions answered and I acknowledge that no guarantee has been made as to the result that may be obtained.    4.   Should the need arise during my operation/procedure, which includes change of level of care prior to discharge, I also consent to the administration of blood and/or blood products.  Further, I understand that despite careful testing and screening of blood or blood products by collecting agencies, I may still be subject to ill effects as a result of receiving a blood transfusion and/or blood products.  The following are some, but not all, of the potential risks that can occur: fever and allergic reactions, hemolytic reactions, transmission of diseases such as Hepatitis, AIDS and  Cytomegalovirus (CMV) and fluid overload.  In the event that I wish to have an autologous transfusion of my own blood, or a directed donor transfusion, I will discuss this with my physician.   Check only if Refusing Blood or Blood Products  I understand refusal of blood or blood products as deemed necessary by my physician may have serious consequences to my condition to include possible death. I hereby assume responsibility for my refusal and release the hospital, its personnel, and my physicians from any responsibility for the consequences of my refusal.         o  Refuse         5.   I authorize the use of any specimen, organs, tissues, body parts or foreign objects that may be removed from my body during the operation/procedure for diagnosis, research or teaching purposes and their subsequent disposal by hospital authorities.  I also authorize the release of specimen test results and/or written reports to my treating physician on the hospital medical staff or other referring or consulting physicians involved in my care, at the discretion of the Pathologist or my treating physician.    6.   I consent to the photographing or videotaping of the operations or procedures to be performed, including appropriate portions of my body for medical, scientific, or educational purposes, provided my identity is not revealed by the pictures or by descriptive texts accompanying them.  If the procedure has been photographed/videotaped, the surgeon will obtain the original picture, image, videotape or CD.  The hospital will not be responsible for storage, release or maintenance of the picture, image, tape or CD.    7.   I consent to the presence of a  or observers in the operating room as deemed necessary by my physician or their designees.    8.   I recognize that in the event my procedure results in extended X-Ray/fluoroscopy time, I may develop a skin reaction.    9. If I have a Do Not Attempt Resuscitation  (DNAR) order in place, that status will be suspended while in the operating room, procedural suite, and during the recovery period unless otherwise explicitly stated by me (or a person authorized to consent on my behalf). The surgeon or my attending physician will determine when the applicable recovery period ends for purposes of reinstating the DNAR order.  10. Patients having a sterilization procedure: I understand that if the procedure is successful the results will be permanent and it will therefore be impossible for me to inseminate, conceive, or bear children.  I also understand that the procedure is intended to result in sterility, although the result has not been guaranteed.   11. I acknowledge that my physician has explained sedation/analgesia administration to me including the risk and benefits I consent to the administration of sedation/analgesia as may be necessary or desirable in the judgment of my physician.    I CERTIFY THAT I HAVE READ AND FULLY UNDERSTAND THE ABOVE CONSENT TO OPERATION and/or OTHER PROCEDURE.        ____________________________________       _________________________________      ______________________________  Signature of Patient         Signature of Responsible Person        Printed Name of Responsible Person        ____________________________________      _________________________________      ______________________________       Signature of Witness          Relationship to Patient                       Date                                       Time    Patient Name: Jacob Patton     : 2000                 Printed: 2024      Medical Record #: [id number]                      Page 1 of 2          STATEMENT OF PHYSICIAN My signature below affirms that prior to the time of the procedure; I have explained to the patient and/or his/her legal representative, the risks and benefits involved in the proposed treatment and any reasonable alternative to the  proposed treatment. I have also explained the risks and benefits involved in refusal of the proposed treatment and alternatives to the proposed treatment and have answered the patient's questions. If I have a significant financial interest in a co-management agreement or a significant financial interest in any product or implant, or other significant relationship used in this procedure/surgery, I have disclosed this and had a discussion with my patient.     _______________________________________________________________ _____________________________  (Signature of Physician)                                                                                         (Date)                                   (Time)    Patient Name: Jacob Patton     : 2000                 Printed: 2024      Medical Record #: [id number]                      Page 2 of 2

## (undated) NOTE — LETTER
7/2/2024              Jacob Patton        [address] [facility] [address]         Dear Jacob,    Our records indicate that the tests ordered for you by Atif Story MD  have not been done.     MRI ENTEROGRAPHY ABD/PLV (W+WO)SH(CPT=72197/32339) (Order #[id number]) on 5/21/24        If you have, in fact, already completed the tests or you do not wish to have the tests done, please contact our office at THE NUMBER LISTED BELOW.  Otherwise, please proceed with the testing.        Sincerely,    Atif Story MD  [address]  [facility] [address] [phone]

## (undated) NOTE — LETTER
[facility] EMERGENCY DEPARTMENT  [address]  [facility] [address]  [phone]    Blood Transfusion Consent    In the course of your treatment, it may become necessary to administer a transfusion of blood or blood components. This form provides basic information concerning this procedure and, if signed by you, authorizes its administration. By signing this form, you agree that all of your questions about the administration of blood or blood products have been answered by the ordering medical professional or designee.    Description of Procedure  Blood is introduced into one of your veins, commonly in the arm, using a sterilized disposable needle. The amount of blood transfused, and whether the transfusion will be of blood or blood components is a judgement the physician will make based on your particular needs.    Risks  The transfusion is a common procedure of low risk.  MINOR AND TEMPORARY REACTIONS ARE NOT UNCOMMON, including a slight bruise, swelling or local reaction in the area where the needle pierces your skin, or a nonserious reaction to the transfused material itself, including headache, fever or mild skin reaction, such as rash.  Serious reactions are possible, though very unlikely, and include severe allergic reaction (shock) and destruction (hemolysis) of transfused blood cells.  Infectious diseases which are known to be transmitted by blood transfusion include certain types of viral Hepatitis(liver infection from a virus), Human Immunodeficiency Virus (HIV-1,2) infection, a viral infection known to cause Acquired Immunodeficiency Syndrome (AIDS), as well as certain other bacterial, viral, and parasitic diseases. While a minimal risk of acquiring an infectious disease from transfused blood exists, in accordance with the Federal and State law, all due care has been taken in donor selection and testing to avoid transmission of disease.    Alternatives  If loss of blood poses serious threats  during your treatment, THERE IS NO EFFECTIVE ALTERNATIVE TO BLOOD TRANSFUSION. However, if you have any further questions on this matter, your provider will fully explain the alternatives to you if it has not already been done.    I, ______________________________, have read/had read to me the above. I understand the matters bearing on the decision whether or not to authorize a transfusion of blood or blood components. I have no questions which have not been answered to my full satisfaction. I hereby consent to such transfusion as my physician may deem necessary or advisable in the course of my treatment.    ______________________________________________                    ___________________________  (Signature of Patient or Responsible party in case of minor,                 (Printed Name of Patient or incompetent, or unconscious patient)              Responsible Party)    ___________________________               _____________________  (Relationship to Patient if not self)                                    (Date and Time)    __________________________                                                           ______________________              (Signature of Witness)               (Printed Name of Witness)     Language line ()    Telephone/Verbal/Video Consent    __________________________                     ____________________  (Signature of 2nd Witness           (Printed Name of 2nd  Telephone/Verbal/Video Consent)           Witness)    Patient Name: Jacob Patton     : 2000                 Printed: 2024     Medical Record #: [id number]      Rev: 2023

## (undated) NOTE — LETTER
Date: 2/20/2024    Patient Name: Jacob Patton          To Whom it may concern:    This letter has been written at the patient's request. The above patient was seen at the [facility] for treatment of a medical condition.    This patient should be excused from attending work/school from 02/12/2024 through 02/25/2024    The patient may return to work/school on 02/26/2024 with no limitations.         Sincerely,              Nj Randhawa MD